# Patient Record
Sex: MALE | Race: WHITE | NOT HISPANIC OR LATINO | Employment: UNEMPLOYED | URBAN - METROPOLITAN AREA
[De-identification: names, ages, dates, MRNs, and addresses within clinical notes are randomized per-mention and may not be internally consistent; named-entity substitution may affect disease eponyms.]

---

## 2022-07-11 ENCOUNTER — APPOINTMENT (OUTPATIENT)
Dept: CT IMAGING | Facility: CLINIC | Age: 47
End: 2022-07-11
Attending: EMERGENCY MEDICINE
Payer: MEDICAID

## 2022-07-11 ENCOUNTER — HOSPITAL ENCOUNTER (INPATIENT)
Facility: CLINIC | Age: 47
LOS: 4 days | Discharge: HOME | End: 2022-07-15
Attending: EMERGENCY MEDICINE | Admitting: INTERNAL MEDICINE
Payer: MEDICAID

## 2022-07-11 ENCOUNTER — APPOINTMENT (OUTPATIENT)
Dept: GENERAL RADIOLOGY | Facility: CLINIC | Age: 47
End: 2022-07-11
Attending: EMERGENCY MEDICINE
Payer: MEDICAID

## 2022-07-11 DIAGNOSIS — F22 PARANOIA (H): ICD-10-CM

## 2022-07-11 DIAGNOSIS — N17.9 ACUTE RENAL FAILURE, UNSPECIFIED ACUTE RENAL FAILURE TYPE (H): ICD-10-CM

## 2022-07-11 LAB
ALBUMIN SERPL-MCNC: 4.5 G/DL (ref 3.4–5)
ALBUMIN UR-MCNC: 30 MG/DL
ALP SERPL-CCNC: 71 U/L (ref 40–150)
ALT SERPL W P-5'-P-CCNC: 49 U/L (ref 0–70)
AMPHETAMINES UR QL SCN: ABNORMAL
ANION GAP SERPL CALCULATED.3IONS-SCNC: 10 MMOL/L (ref 3–14)
APPEARANCE UR: ABNORMAL
AST SERPL W P-5'-P-CCNC: 54 U/L (ref 0–45)
BARBITURATES UR QL: ABNORMAL
BASOPHILS # BLD AUTO: 0.1 10E3/UL (ref 0–0.2)
BASOPHILS NFR BLD AUTO: 0 %
BENZODIAZ UR QL: ABNORMAL
BILIRUB SERPL-MCNC: 0.8 MG/DL (ref 0.2–1.3)
BILIRUB UR QL STRIP: ABNORMAL
BUN SERPL-MCNC: 66 MG/DL (ref 7–30)
CALCIUM SERPL-MCNC: 8.5 MG/DL (ref 8.5–10.1)
CANNABINOIDS UR QL SCN: ABNORMAL
CHLORIDE BLD-SCNC: 97 MMOL/L (ref 94–109)
CK SERPL-CCNC: 1691 U/L (ref 30–300)
CO2 SERPL-SCNC: 22 MMOL/L (ref 20–32)
COCAINE UR QL: ABNORMAL
COLOR UR AUTO: YELLOW
CREAT SERPL-MCNC: 4.34 MG/DL (ref 0.66–1.25)
EOSINOPHIL # BLD AUTO: 0.1 10E3/UL (ref 0–0.7)
EOSINOPHIL NFR BLD AUTO: 1 %
ERYTHROCYTE [DISTWIDTH] IN BLOOD BY AUTOMATED COUNT: 12.7 % (ref 10–15)
ETHANOL SERPL-MCNC: <0.01 G/DL
GFR SERPL CREATININE-BSD FRML MDRD: 16 ML/MIN/1.73M2
GLUCOSE BLD-MCNC: 114 MG/DL (ref 70–99)
GLUCOSE UR STRIP-MCNC: NEGATIVE MG/DL
HCT VFR BLD AUTO: 50.1 % (ref 40–53)
HGB BLD-MCNC: 17 G/DL (ref 13.3–17.7)
HGB UR QL STRIP: NEGATIVE
HOLD SPECIMEN: NORMAL
HOLD SPECIMEN: NORMAL
HYALINE CASTS: 97 /LPF
IMM GRANULOCYTES # BLD: 0.1 10E3/UL
IMM GRANULOCYTES NFR BLD: 1 %
KETONES UR STRIP-MCNC: ABNORMAL MG/DL
LEUKOCYTE ESTERASE UR QL STRIP: ABNORMAL
LYMPHOCYTES # BLD AUTO: 2 10E3/UL (ref 0.8–5.3)
LYMPHOCYTES NFR BLD AUTO: 12 %
MCH RBC QN AUTO: 31.2 PG (ref 26.5–33)
MCHC RBC AUTO-ENTMCNC: 33.9 G/DL (ref 31.5–36.5)
MCV RBC AUTO: 92 FL (ref 78–100)
MONOCYTES # BLD AUTO: 1.2 10E3/UL (ref 0–1.3)
MONOCYTES NFR BLD AUTO: 7 %
MUCOUS THREADS #/AREA URNS LPF: PRESENT /LPF
NEUTROPHILS # BLD AUTO: 13.2 10E3/UL (ref 1.6–8.3)
NEUTROPHILS NFR BLD AUTO: 79 %
NITRATE UR QL: NEGATIVE
NRBC # BLD AUTO: 0 10E3/UL
NRBC BLD AUTO-RTO: 0 /100
OPIATES UR QL SCN: ABNORMAL
PH UR STRIP: 5 [PH] (ref 5–7)
PLATELET # BLD AUTO: 195 10E3/UL (ref 150–450)
POTASSIUM BLD-SCNC: 3.2 MMOL/L (ref 3.4–5.3)
PROT SERPL-MCNC: 8.3 G/DL (ref 6.8–8.8)
RBC # BLD AUTO: 5.45 10E6/UL (ref 4.4–5.9)
RBC URINE: 2 /HPF
SARS-COV-2 RNA RESP QL NAA+PROBE: NEGATIVE
SODIUM SERPL-SCNC: 129 MMOL/L (ref 133–144)
SP GR UR STRIP: 1.02 (ref 1–1.03)
SQUAMOUS EPITHELIAL: <1 /HPF
TSH SERPL DL<=0.005 MIU/L-ACNC: 1.34 MU/L (ref 0.4–4)
UROBILINOGEN UR STRIP-MCNC: 3 MG/DL
WBC # BLD AUTO: 16.7 10E3/UL (ref 4–11)
WBC URINE: 3 /HPF

## 2022-07-11 PROCEDURE — 82077 ASSAY SPEC XCP UR&BREATH IA: CPT | Performed by: EMERGENCY MEDICINE

## 2022-07-11 PROCEDURE — 82550 ASSAY OF CK (CPK): CPT | Performed by: EMERGENCY MEDICINE

## 2022-07-11 PROCEDURE — 36415 COLL VENOUS BLD VENIPUNCTURE: CPT | Performed by: EMERGENCY MEDICINE

## 2022-07-11 PROCEDURE — 80053 COMPREHEN METABOLIC PANEL: CPT | Performed by: EMERGENCY MEDICINE

## 2022-07-11 PROCEDURE — 258N000003 HC RX IP 258 OP 636: Performed by: EMERGENCY MEDICINE

## 2022-07-11 PROCEDURE — 84100 ASSAY OF PHOSPHORUS: CPT | Performed by: INTERNAL MEDICINE

## 2022-07-11 PROCEDURE — 99285 EMERGENCY DEPT VISIT HI MDM: CPT | Mod: 25

## 2022-07-11 PROCEDURE — 93005 ELECTROCARDIOGRAM TRACING: CPT

## 2022-07-11 PROCEDURE — C9803 HOPD COVID-19 SPEC COLLECT: HCPCS

## 2022-07-11 PROCEDURE — 81001 URINALYSIS AUTO W/SCOPE: CPT | Performed by: EMERGENCY MEDICINE

## 2022-07-11 PROCEDURE — 84300 ASSAY OF URINE SODIUM: CPT | Performed by: INTERNAL MEDICINE

## 2022-07-11 PROCEDURE — 74176 CT ABD & PELVIS W/O CONTRAST: CPT

## 2022-07-11 PROCEDURE — U0005 INFEC AGEN DETEC AMPLI PROBE: HCPCS | Performed by: EMERGENCY MEDICINE

## 2022-07-11 PROCEDURE — 80307 DRUG TEST PRSMV CHEM ANLYZR: CPT | Performed by: EMERGENCY MEDICINE

## 2022-07-11 PROCEDURE — 120N000001 HC R&B MED SURG/OB

## 2022-07-11 PROCEDURE — 84443 ASSAY THYROID STIM HORMONE: CPT | Performed by: EMERGENCY MEDICINE

## 2022-07-11 PROCEDURE — 73130 X-RAY EXAM OF HAND: CPT | Mod: RT

## 2022-07-11 PROCEDURE — 85025 COMPLETE CBC W/AUTO DIFF WBC: CPT | Performed by: EMERGENCY MEDICINE

## 2022-07-11 PROCEDURE — 96360 HYDRATION IV INFUSION INIT: CPT

## 2022-07-11 RX ADMIN — SODIUM CHLORIDE 1000 ML: 9 INJECTION, SOLUTION INTRAVENOUS at 22:49

## 2022-07-11 ASSESSMENT — ENCOUNTER SYMPTOMS
DIARRHEA: 1
FREQUENCY: 1

## 2022-07-11 ASSESSMENT — ACTIVITIES OF DAILY LIVING (ADL): ADLS_ACUITY_SCORE: 35
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
## 2022-07-12 ENCOUNTER — TELEPHONE (OUTPATIENT)
Dept: BEHAVIORAL HEALTH | Facility: CLINIC | Age: 47
End: 2022-07-12

## 2022-07-12 LAB
ANION GAP SERPL CALCULATED.3IONS-SCNC: 7 MMOL/L (ref 3–14)
ATRIAL RATE - MUSE: 110 BPM
BUN SERPL-MCNC: 63 MG/DL (ref 7–30)
CALCIUM SERPL-MCNC: 7.8 MG/DL (ref 8.5–10.1)
CHLORIDE BLD-SCNC: 104 MMOL/L (ref 94–109)
CK SERPL-CCNC: 1064 U/L (ref 30–300)
CO2 SERPL-SCNC: 23 MMOL/L (ref 20–32)
CREAT SERPL-MCNC: 2.86 MG/DL (ref 0.66–1.25)
CREAT UR-MCNC: 653 MG/DL
DIASTOLIC BLOOD PRESSURE - MUSE: NORMAL MMHG
ERYTHROCYTE [DISTWIDTH] IN BLOOD BY AUTOMATED COUNT: 12.8 % (ref 10–15)
FRACT EXCRET NA UR+SERPL-RTO: 0.1 %
GFR SERPL CREATININE-BSD FRML MDRD: 26 ML/MIN/1.73M2
GLUCOSE BLD-MCNC: 86 MG/DL (ref 70–99)
HCT VFR BLD AUTO: 45.8 % (ref 40–53)
HGB BLD-MCNC: 15.4 G/DL (ref 13.3–17.7)
INTERPRETATION ECG - MUSE: NORMAL
MCH RBC QN AUTO: 31.4 PG (ref 26.5–33)
MCHC RBC AUTO-ENTMCNC: 33.6 G/DL (ref 31.5–36.5)
MCV RBC AUTO: 94 FL (ref 78–100)
P AXIS - MUSE: 64 DEGREES
PHOSPHATE SERPL-MCNC: 4.8 MG/DL (ref 2.5–4.5)
PLATELET # BLD AUTO: 142 10E3/UL (ref 150–450)
POTASSIUM BLD-SCNC: 3.4 MMOL/L (ref 3.4–5.3)
PR INTERVAL - MUSE: 154 MS
QRS DURATION - MUSE: 98 MS
QT - MUSE: 358 MS
QTC - MUSE: 484 MS
R AXIS - MUSE: 46 DEGREES
RBC # BLD AUTO: 4.9 10E6/UL (ref 4.4–5.9)
SODIUM SERPL-SCNC: 134 MMOL/L (ref 133–144)
SODIUM UR-SCNC: 17 MMOL/L
SYSTOLIC BLOOD PRESSURE - MUSE: NORMAL MMHG
T AXIS - MUSE: 74 DEGREES
VENTRICULAR RATE- MUSE: 110 BPM
WBC # BLD AUTO: 9.5 10E3/UL (ref 4–11)

## 2022-07-12 PROCEDURE — 99222 1ST HOSP IP/OBS MODERATE 55: CPT | Mod: AI | Performed by: INTERNAL MEDICINE

## 2022-07-12 PROCEDURE — 82550 ASSAY OF CK (CPK): CPT | Performed by: INTERNAL MEDICINE

## 2022-07-12 PROCEDURE — 82310 ASSAY OF CALCIUM: CPT | Performed by: INTERNAL MEDICINE

## 2022-07-12 PROCEDURE — 258N000003 HC RX IP 258 OP 636: Performed by: INTERNAL MEDICINE

## 2022-07-12 PROCEDURE — 85027 COMPLETE CBC AUTOMATED: CPT | Performed by: INTERNAL MEDICINE

## 2022-07-12 PROCEDURE — 120N000001 HC R&B MED SURG/OB

## 2022-07-12 PROCEDURE — 36415 COLL VENOUS BLD VENIPUNCTURE: CPT | Performed by: INTERNAL MEDICINE

## 2022-07-12 RX ORDER — LIDOCAINE 40 MG/G
CREAM TOPICAL
Status: DISCONTINUED | OUTPATIENT
Start: 2022-07-12 | End: 2022-07-15 | Stop reason: HOSPADM

## 2022-07-12 RX ORDER — LISINOPRIL 20 MG/1
20 TABLET ORAL DAILY
COMMUNITY

## 2022-07-12 RX ORDER — SODIUM CHLORIDE 9 MG/ML
INJECTION, SOLUTION INTRAVENOUS CONTINUOUS
Status: DISCONTINUED | OUTPATIENT
Start: 2022-07-12 | End: 2022-07-13

## 2022-07-12 RX ORDER — ACETAMINOPHEN 650 MG/1
650 SUPPOSITORY RECTAL EVERY 6 HOURS PRN
Status: DISCONTINUED | OUTPATIENT
Start: 2022-07-12 | End: 2022-07-15 | Stop reason: HOSPADM

## 2022-07-12 RX ORDER — RISANKIZUMAB-RZAA 150 MG/ML
150 INJECTION SUBCUTANEOUS
COMMUNITY

## 2022-07-12 RX ORDER — HALOPERIDOL 5 MG/ML
2 INJECTION INTRAMUSCULAR EVERY 6 HOURS PRN
Status: DISCONTINUED | OUTPATIENT
Start: 2022-07-12 | End: 2022-07-15 | Stop reason: HOSPADM

## 2022-07-12 RX ORDER — ONDANSETRON 4 MG/1
4 TABLET, ORALLY DISINTEGRATING ORAL EVERY 6 HOURS PRN
Status: DISCONTINUED | OUTPATIENT
Start: 2022-07-12 | End: 2022-07-15 | Stop reason: HOSPADM

## 2022-07-12 RX ORDER — ACETAMINOPHEN 325 MG/1
650 TABLET ORAL EVERY 6 HOURS PRN
Status: DISCONTINUED | OUTPATIENT
Start: 2022-07-12 | End: 2022-07-15 | Stop reason: HOSPADM

## 2022-07-12 RX ORDER — QUETIAPINE FUMARATE 25 MG/1
25 TABLET, FILM COATED ORAL 2 TIMES DAILY PRN
Status: DISCONTINUED | OUTPATIENT
Start: 2022-07-12 | End: 2022-07-15 | Stop reason: HOSPADM

## 2022-07-12 RX ORDER — ONDANSETRON 2 MG/ML
4 INJECTION INTRAMUSCULAR; INTRAVENOUS EVERY 6 HOURS PRN
Status: DISCONTINUED | OUTPATIENT
Start: 2022-07-12 | End: 2022-07-15 | Stop reason: HOSPADM

## 2022-07-12 RX ADMIN — SODIUM CHLORIDE: 9 INJECTION, SOLUTION INTRAVENOUS at 02:23

## 2022-07-12 RX ADMIN — SODIUM CHLORIDE 1000 ML: 9 INJECTION, SOLUTION INTRAVENOUS at 08:25

## 2022-07-12 RX ADMIN — SODIUM CHLORIDE: 9 INJECTION, SOLUTION INTRAVENOUS at 12:25

## 2022-07-12 ASSESSMENT — ACTIVITIES OF DAILY LIVING (ADL)
ADLS_ACUITY_SCORE: 35

## 2022-07-12 NOTE — ED NOTES
Patient said he wanted to leave bacause he cannot afford the hospital bill. He was agreeable to wait in room until MD could be paged. MD paged. MD placed order for 72 hr hold. MD now talking to patient.
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
## 2022-07-12 NOTE — CONSULTS
Received request for consult.  Reviewed MR and noted Sticky Note that stated no consult until pt has moved to inpt bed (note @ 8:45a).  As pt not yet placed, will delay consult per provider's request.
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
## 2022-07-12 NOTE — PHARMACY-ADMISSION MEDICATION HISTORY
Admission medication history interview status for this patient is complete. See [facility] admission navigator for allergy information, prior to admission medications and immunization status.     Medication history interview done, indicate source(s): Patient  Medication history resources (including written lists, pill bottles, clinic record):None  Pharmacy: [facility] [place]    Changes made to PTA medication list:  Added: all meds  Changed:-  Reported as Not Taking:-  Removed:-    Actions taken by pharmacist (provider contacted, etc):None   Additional medication history information:None  Medication reconciliation/reorder completed by provider prior to medication history?  no    Prior to Admission medications    Medication Sig Last Dose Taking? Auth Provider Long Term End Date   lisinopril (ZESTRIL) 20 MG tablet Take 20 mg by mouth daily 7/10/2022 at am Yes Unknown, Entered By History Yes    Risankizumab-rzaa (SKYRIZI) 150 MG/ML subcutaneous Inject 150 mg Subcutaneous every 3 months 5/11/2022 Yes Unknown, Entered By History

## 2022-07-12 NOTE — PROGRESS NOTES
Pt attempted to leave hospital AMA. Code 21 was called; Pt made it outside of hospital, Security intervened and safely brought pt inside hospital to his room 331. Pt was provided information on why he was on a 72 hr hold; pt verbalized understanding

## 2022-07-12 NOTE — PROGRESS NOTES
I spoke with behavioral health Eva Hopper who placed on intake list.  Day provider should reach out when medically ready for discharge.     Obed Odonnell MD

## 2022-07-12 NOTE — TELEPHONE ENCOUNTER
S DEC called to give clinical on 47/M on [facility] med surge 3 [phone]    B Admitted with acute renal failure, psychosis, delusional. Schizoaffective bipolar type. Worsening delusions and paranoia. No SI or HI. No MH meds. Tried to elope from hospital this afternoon, but came back and calm down. Pt reports he is also willing to come in voluntary. No prev IPMH stays. On IV drip, medically cleared tomorrow. Ambulatory, eating drinking.     A 72HH, [facility] area for placement.     R [facility] 3 awaiting placement

## 2022-07-13 ENCOUNTER — TELEPHONE (OUTPATIENT)
Dept: BEHAVIORAL HEALTH | Facility: CLINIC | Age: 47
End: 2022-07-13

## 2022-07-13 LAB
ANION GAP SERPL CALCULATED.3IONS-SCNC: 4 MMOL/L (ref 3–14)
BUN SERPL-MCNC: 31 MG/DL (ref 7–30)
CALCIUM SERPL-MCNC: 7.6 MG/DL (ref 8.5–10.1)
CHLORIDE BLD-SCNC: 111 MMOL/L (ref 94–109)
CK SERPL-CCNC: 579 U/L (ref 30–300)
CO2 SERPL-SCNC: 24 MMOL/L (ref 20–32)
CREAT SERPL-MCNC: 0.96 MG/DL (ref 0.66–1.25)
GFR SERPL CREATININE-BSD FRML MDRD: >90 ML/MIN/1.73M2
GLUCOSE BLD-MCNC: 91 MG/DL (ref 70–99)
POTASSIUM BLD-SCNC: 4 MMOL/L (ref 3.4–5.3)
SODIUM SERPL-SCNC: 139 MMOL/L (ref 133–144)

## 2022-07-13 PROCEDURE — 258N000003 HC RX IP 258 OP 636: Performed by: INTERNAL MEDICINE

## 2022-07-13 PROCEDURE — 99232 SBSQ HOSP IP/OBS MODERATE 35: CPT | Performed by: INTERNAL MEDICINE

## 2022-07-13 PROCEDURE — 82550 ASSAY OF CK (CPK): CPT | Performed by: INTERNAL MEDICINE

## 2022-07-13 PROCEDURE — 250N000013 HC RX MED GY IP 250 OP 250 PS 637: Performed by: INTERNAL MEDICINE

## 2022-07-13 PROCEDURE — 82310 ASSAY OF CALCIUM: CPT | Performed by: INTERNAL MEDICINE

## 2022-07-13 PROCEDURE — 99252 IP/OBS CONSLTJ NEW/EST SF 35: CPT | Mod: 93 | Performed by: NURSE PRACTITIONER

## 2022-07-13 PROCEDURE — 120N000001 HC R&B MED SURG/OB

## 2022-07-13 PROCEDURE — 36415 COLL VENOUS BLD VENIPUNCTURE: CPT | Performed by: INTERNAL MEDICINE

## 2022-07-13 RX ORDER — HYDROXYZINE HYDROCHLORIDE 50 MG/1
50 TABLET, FILM COATED ORAL 3 TIMES DAILY
Status: DISCONTINUED | OUTPATIENT
Start: 2022-07-13 | End: 2022-07-14

## 2022-07-13 RX ORDER — DIAPER,BRIEF,INFANT-TODD,DISP
EACH MISCELLANEOUS 2 TIMES DAILY
Status: DISCONTINUED | OUTPATIENT
Start: 2022-07-13 | End: 2022-07-15 | Stop reason: HOSPADM

## 2022-07-13 RX ORDER — OLANZAPINE 5 MG/1
5 TABLET, ORALLY DISINTEGRATING ORAL AT BEDTIME
Status: DISCONTINUED | OUTPATIENT
Start: 2022-07-13 | End: 2022-07-14

## 2022-07-13 RX ADMIN — HYDROXYZINE HYDROCHLORIDE 50 MG: 50 TABLET, FILM COATED ORAL at 22:33

## 2022-07-13 RX ADMIN — SODIUM CHLORIDE: 9 INJECTION, SOLUTION INTRAVENOUS at 00:24

## 2022-07-13 RX ADMIN — OLANZAPINE 5 MG: 5 TABLET, ORALLY DISINTEGRATING ORAL at 22:33

## 2022-07-13 RX ADMIN — HYDROCORTISONE: 0.5 CREAM TOPICAL at 23:08

## 2022-07-13 RX ADMIN — HYDROXYZINE HYDROCHLORIDE 50 MG: 50 TABLET, FILM COATED ORAL at 16:19

## 2022-07-13 RX ADMIN — SODIUM CHLORIDE: 9 INJECTION, SOLUTION INTRAVENOUS at 08:03

## 2022-07-13 ASSESSMENT — ACTIVITIES OF DAILY LIVING (ADL)
ADLS_ACUITY_SCORE: 35

## 2022-07-13 NOTE — TELEPHONE ENCOUNTER
S: Vivienne,  [phone], [facility] 3rd Floor, [phone],     B: Hx of schizophrenia, depression   Per chart review pt is experiencing paranoia and delusional thoughts that are affecting the pts ability to reside indep and safely in the community, and would benefit from IP MH and med stabilization   Pt was admitted to medical after acute kidney injury due to dehydration and poor PO intake on 7/11/22  Pt denies SI, HI, aggression, SIB  Pt attempted to leave AMA yesterday (7/12), was placed on a hold   Pt endorses THC use     covid neg   Medically cleared, eating, drinking, ambulating indep    A: 72  7/12@300pm     R: Pt placed on work list until appropriate placement is available

## 2022-07-13 NOTE — PLAN OF CARE
Goal Outcome Evaluation:    A/O x 4, VSS on RA. Pt was cooperative with pm cares. Denied pain. Pt is on 72 HR hold. Has  ml/hr running; new PIV
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
## 2022-07-13 NOTE — CONSULTS
Care Management Follow Up    Length of Stay (days): 2    Expected Discharge Date: 07/14/2022     Concerns to be Addressed:  72 hour hold     Patient plan of care discussed at interdisciplinary rounds: Yes     Agreement with the Plan:      Referrals Placed by CM/PANFILO:    Private pay costs discussed: Not applicable    Additional Information:  SW was consulted for a 72 hour hold. Care Management team does not do 72-hour holds. This should be completed by RN or MD.  Consult cleared. Please re consult SW when patient is medically ready to be placed on the IP Psych list.     Of note, patient's hold expires 7/15/22 at 1500. There is a possibility that there would not be an inpatient psych bed prior to the hold expiring. Psych has been consulted.     GELY Mccurdy, SW  Emergency Room

## 2022-07-13 NOTE — CONSULTS
Care Management Follow Up    Length of Stay (days): 2    Expected Discharge Date: 07/14/2022     Concerns to be Addressed:       Patient plan of care discussed at interdisciplinary rounds: Yes    Anticipated Discharge Disposition:       Anticipated Discharge Services:    Anticipated Discharge DME:      Patient/family educated on Medicare website which has current facility and service quality ratings:    Education Provided on the Discharge Plan:    Patient/Family in Agreement with the Plan:      Referrals Placed by CM/SW:    Private pay costs discussed: Not applicable    Additional Information:  Patient referred to financial counselor about concerns over his medical bill. PANFILO tried to place patient on the inpatient psych wait list. Central intake reports that he has to be completely off IV fluids before the patient can be placed on wait list despite the notes stating that the patient is medically stable. There are 30 patients on the wait list. PANFILO will update MD.     Per MD, patient has stopped IV fluids and is eating.     Financial Counselor have met with patient and he has active insurance.     Addendum 1550:  Psych is requesting a commitment. PANFILO paged MD. Will need paperwork completed.         Vivienne Burns, GELY, SW  Emergency Room       Vivienne Burns

## 2022-07-13 NOTE — CONSULTS
Triage and Transition - Consult and Liaison     Chevy Morris  1975 July 13, 2022    Psychiatry consult acknowledged. Please see note completed by Nathalie Linares  on 7/13/22.  [facility] will see patient tomorrow for follow up as well.     BEATA NICHOLS MSW, [facility]  Triage and Transition - Consult and Liaison   [phone]

## 2022-07-13 NOTE — TELEPHONE ENCOUNTER
R: Pt currently on [facility] 3 Med/surg awaiting medical to behavioral transfer.    1108 Per chart review patient is not medically cleared still requiring IVF bolus and continue IVFs. Intake escalated to intake management. Intake awaiting response.     1406 Per chart review patient is no longer requiring IVF. Pt still has IV in place which is exclusionary criteria for IP MH. Intake awaiting medical clearance prior to bed search. Provider states that recommends psych reassessment tomorrow. Intake awaiting assessment and medical clearance.

## 2022-07-13 NOTE — PLAN OF CARE
Goal Outcome Evaluation:    Please see flowsheets for detailed vital signs and assessments.   Neuro: alert, oriented  Vital Signs: stable  Pain: denies  O2: mid 90s RA  Tele: N/A  Respiratory: LS WDL  GI: WDL  : voiding w/o difficulty  Skin: Redness to feet  Activity: independent in room  IVF:  ml/hour  Notable labs: K 3.4, Cr 2.86 (trending down) CK 1064 (trending down)  Protocols: N/A  Plan: IV hydration, 1:1 monitoring (high flight risk), 72-hour hold and probable inpatient psychiatric hospitalization. Continue with plan of care.   Discharge: 2-3 days

## 2022-07-13 NOTE — PLAN OF CARE
VSS except slight HTN of 150/93, on RA, A/OX4, LS clear, IVF discontinued, pt eating well, door alarm active, pt on 72 hour hold, discharge timeline TBD.

## 2022-07-14 ENCOUNTER — TELEPHONE (OUTPATIENT)
Dept: BEHAVIORAL HEALTH | Facility: CLINIC | Age: 47
End: 2022-07-14

## 2022-07-14 LAB
ANION GAP SERPL CALCULATED.3IONS-SCNC: 3 MMOL/L (ref 3–14)
BUN SERPL-MCNC: 20 MG/DL (ref 7–30)
CALCIUM SERPL-MCNC: 8.6 MG/DL (ref 8.5–10.1)
CHLORIDE BLD-SCNC: 113 MMOL/L (ref 94–109)
CK SERPL-CCNC: 282 U/L (ref 30–300)
CO2 SERPL-SCNC: 25 MMOL/L (ref 20–32)
CREAT SERPL-MCNC: 0.89 MG/DL (ref 0.66–1.25)
GFR SERPL CREATININE-BSD FRML MDRD: >90 ML/MIN/1.73M2
GLUCOSE BLD-MCNC: 82 MG/DL (ref 70–99)
POTASSIUM BLD-SCNC: 4.2 MMOL/L (ref 3.4–5.3)
SODIUM SERPL-SCNC: 141 MMOL/L (ref 133–144)

## 2022-07-14 PROCEDURE — 99232 SBSQ HOSP IP/OBS MODERATE 35: CPT | Performed by: INTERNAL MEDICINE

## 2022-07-14 PROCEDURE — 82550 ASSAY OF CK (CPK): CPT | Performed by: INTERNAL MEDICINE

## 2022-07-14 PROCEDURE — 36415 COLL VENOUS BLD VENIPUNCTURE: CPT | Performed by: INTERNAL MEDICINE

## 2022-07-14 PROCEDURE — 120N000001 HC R&B MED SURG/OB

## 2022-07-14 PROCEDURE — 80048 BASIC METABOLIC PNL TOTAL CA: CPT | Performed by: INTERNAL MEDICINE

## 2022-07-14 PROCEDURE — 250N000013 HC RX MED GY IP 250 OP 250 PS 637: Performed by: INTERNAL MEDICINE

## 2022-07-14 PROCEDURE — 99232 SBSQ HOSP IP/OBS MODERATE 35: CPT | Performed by: NURSE PRACTITIONER

## 2022-07-14 RX ORDER — LISINOPRIL 20 MG/1
20 TABLET ORAL DAILY
Status: DISCONTINUED | OUTPATIENT
Start: 2022-07-14 | End: 2022-07-15 | Stop reason: HOSPADM

## 2022-07-14 RX ORDER — HYDROXYZINE HYDROCHLORIDE 25 MG/1
25 TABLET, FILM COATED ORAL 3 TIMES DAILY
Status: DISCONTINUED | OUTPATIENT
Start: 2022-07-14 | End: 2022-07-15

## 2022-07-14 RX ORDER — OLANZAPINE 5 MG/1
10 TABLET, ORALLY DISINTEGRATING ORAL AT BEDTIME
Status: DISCONTINUED | OUTPATIENT
Start: 2022-07-14 | End: 2022-07-15 | Stop reason: HOSPADM

## 2022-07-14 RX ADMIN — HYDROXYZINE HYDROCHLORIDE 50 MG: 50 TABLET, FILM COATED ORAL at 10:08

## 2022-07-14 RX ADMIN — LISINOPRIL 20 MG: 20 TABLET ORAL at 13:43

## 2022-07-14 RX ADMIN — HYDROCORTISONE: 0.5 CREAM TOPICAL at 10:08

## 2022-07-14 RX ADMIN — OLANZAPINE 10 MG: 5 TABLET, ORALLY DISINTEGRATING ORAL at 21:38

## 2022-07-14 RX ADMIN — HYDROXYZINE HYDROCHLORIDE 25 MG: 25 TABLET, FILM COATED ORAL at 21:38

## 2022-07-14 RX ADMIN — HYDROXYZINE HYDROCHLORIDE 25 MG: 25 TABLET, FILM COATED ORAL at 16:13

## 2022-07-14 RX ADMIN — HYDROCORTISONE: 0.5 CREAM TOPICAL at 21:38

## 2022-07-14 ASSESSMENT — ACTIVITIES OF DAILY LIVING (ADL)
ADLS_ACUITY_SCORE: 35
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
## 2022-07-14 NOTE — DISCHARGE INSTRUCTIONS
[facility]  Referral made.  They will contact you directly for appointments to support while waiting for your initial psychiatry appointment. If they do no reachout or if you need to change appointment or have further questions please contact them directly at [phone].     Scheduled Appointment  Date: Monday, 8/15/2022  Time: 2:00 pm - 3:00 pm  Provider: Bismark Solitario MA, CNPRN  Location: [facility], [address] [place] [address], [address], [place] [address]  Phone: [phone]  Type: Telepsychiatry    Patient Instructions  Please fill New Patient Form by using following link. All forms need to be completed 72 hours prior to the appointment date/time by going to [email or web address] Please call us on [phone] 96 hours prior to your scheduled appointment to confirm that you are able to attend. We will provide you information about how to log into video call software when you call.    Scheduled Appointment  Date: Monday, 7/18/2022  Time: 1:00 pm - 2:00 pm  Provider: Jossy Dixon  Location: [place] [id number]  Phone: [phone]  Type: Teletherapy    Patient Instructions  For all appointments: you will be sent information to fill out the intake paperwork online. Please fill the paperwork prior to your appointment. For telehealth appointments: you will also be sent a zoom link to attend the appointment remotely.

## 2022-07-14 NOTE — TELEPHONE ENCOUNTER
Writer has fwd medication management referral only to TC RN pool as next level of care set and replied to referral source. Will wait to hear if referral is appropriate or inappropriate.    Yelena Batistarera  [id number] [phone]    ----- Message from Beata Rod sent at 7/14/2022  3:08 PM CDT -----  Regarding: Referral  Transition Clinic Referral   [place] Only   Limited [place] Availability    Type of Referral:    ____Therapy Only   _x___Medication Only: Referral will automatically be declined if no next level of care scheduled. Suboxone and Opioid management referrals are automatically denied.  ____Therapy & Medication:  Referral will automatically be declined if no next level of care scheduled. Suboxone and Opioid management referrals are automatically denied.  ____Diagnostic Assessment     Suboxone and Opioid Management Referrals are Automatically Denied    TC Psychiatry cannot see patients who do not have active medical insurance: obtained PMI will in hospital for [employer]. He has this.     Referring Provider Name: BEATA ROD    Clinician completing the assessment.BEATA ROD      Referring Provider: [facility]AGE [facility] TRANSITION ([facility]) CLINICIAN     If known, referring provider contact name: BEATA ROD  ; Phone Number: [phone]  Service Line/Location: C&L     Reason for Transition Clinic Referral: depression, anxiety and paranoid delusions    Scheduled Appointment  Date: Monday, 8/15/2022  Time: 2:00 pm - 3:00 pm  Provider: Bismark Solitario MA, CNP,RN  Location: [facility], [address], [address], [place] [address]  Phone: [phone]  Type: Telepsychiatry    Patient Instructions  Please fill New Patient Form by using following link. All forms need to be completed 72 hours prior to the appointment date/time by going to [email or web address] Please call us on [phone] 96 hours prior to your scheduled appointment to confirm that you are able to  attend. We will provide you information about how to log into video call software when you call.    Scheduled Appointment  Date: Monday, 7/18/2022  Time: 1:00 pm - 2:00 pm  Provider: Jossy Dixon  Location: [facility], [address] [place] [address]  Phone: [phone]  Type: Teletherapy    Patient Instructions  For all appointments: you will be sent information to fill out the intake paperwork online. Please fill the paperwork prior to your appointment. For telehealth appointments: you will also be sent a zoom link to attend the appointment remotely.    What Would Be Helpful from the Transition Clinic: Please contact after discharge not before to avoid confusion.      Needs: NO    Does Patient Have Access to Technology: yes    Patient E-mail Address: No e-mail address on record    Current Patient Phone Number: [phone]    Clinician Gender Preference (if applicable): DUNIA NICHOLS
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
## 2022-07-14 NOTE — CONSULTS
Triage and Transition - Consult and Liaison     Chevy Morris  July 14, 2022      This note is entered in addition to note by this Writer and Yinka Che on 7/14/22. Triage and Transition Consult and Liaison has facilitated setting up appointments for Chevy Morris providing outpatient psychiatry and psychotherapy The appointments are as follows:     [facility]  Referral made.  They will contact you directly for appointments to support while waiting for your initial psychiatry appointment. If they do no reachout or if you need to change appointment or have further questions please contact them directly at [phone].     Scheduled Appointment  Date: Monday, 8/15/2022  Time: 2:00 pm - 3:00 pm  Provider: Bismark Solitario MA, CNP,RN  Location: [facility], [address] [place] [address] [address], [place] [address]  Phone: [phone]  Type: Telepsychiatry    Patient Instructions  Please fill New Patient Form by using following link. All forms need to be completed 72 hours prior to the appointment date/time by going to [email or web address] Please call us on [phone] 96 hours prior to your scheduled appointment to confirm that you are able to attend. We will provide you information about how to log into video call software when you call.    Scheduled Appointment  Date: Monday, 7/18/2022  Time: 1:00 pm - 2:00 pm  Provider: Jossy Dixon  Location: [place] [id number]  Phone: [phone]  Type: Teletherapy    Patient Instructions  For all appointments: you will be sent information to fill out the intake paperwork online. Please fill the paperwork prior to your appointment. For telehealth appointments: you will also be sent a zoom link to attend the appointment remotely.    Information has been added to AVS to be provided to patient at discharge.     BEATA NICHOLS MSW, LICSW  Triage and Transition - Consult and  Liaison   [phone]      [facility] maintains an extensive network of licensed behavioral health providers to connect patients with the services they need.  We do not charge providers a fee to participate in our referral network.  We match patients with providers based on a patient s specific treatment needs, insurance coverage, and location. Our first effort will be to refer you to a provider within your care system and will utilize providers outside your care system as needed.

## 2022-07-14 NOTE — PLAN OF CARE
[id number]    Pt alert and oriented x4. Up Independent in the room. Denies pain. VSS. Calm and cooperative with staff. Will continue to monitor.

## 2022-07-14 NOTE — PLAN OF CARE
9:45 Psych spoke with pt via I-pad.    14:35  No c/o pain this shift.  Alert and oriented.  Cooperative with cares. No evidence of delusions today. Mobility, independent in room. Door alarm in place. Lungs clear.  98% RA.  No tele. Regular diet.  Voiding without difficulty.  PIV saline locked.  Hydrocortisone applied to rash on feet per orders. Psych following.  72 hour hold.  Likely to discharge after hold expires per MD note.

## 2022-07-14 NOTE — PLAN OF CARE
Goal Outcome Evaluation:    Plan of Care Reviewed With: patient     Overall Patient Progress: improving       Blood pressure (!) 150/93, pulse 72, temperature 98.2  F (36.8  C), temperature source Oral, resp. rate 20, SpO2 97 %.    VSS other than elevated BP.  Pt alert and oriented x 4 on RA.  States that hospital is a safe environment.  PIV on left SL.  LS Clear.  Door alarm on and active. PT on 72 hour hold.  Discharge timeline TBD. Hydrocortisone cream started for rash on feet.  Continue with POC.

## 2022-07-15 ENCOUNTER — TELEPHONE (OUTPATIENT)
Dept: BEHAVIORAL HEALTH | Facility: CLINIC | Age: 47
End: 2022-07-15

## 2022-07-15 VITALS
DIASTOLIC BLOOD PRESSURE: 92 MMHG | TEMPERATURE: 98.1 F | RESPIRATION RATE: 18 BRPM | OXYGEN SATURATION: 99 % | HEART RATE: 74 BPM | SYSTOLIC BLOOD PRESSURE: 155 MMHG

## 2022-07-15 PROCEDURE — 250N000013 HC RX MED GY IP 250 OP 250 PS 637: Performed by: HOSPITALIST

## 2022-07-15 PROCEDURE — 250N000013 HC RX MED GY IP 250 OP 250 PS 637: Performed by: INTERNAL MEDICINE

## 2022-07-15 PROCEDURE — 93010 ELECTROCARDIOGRAM REPORT: CPT | Mod: 76 | Performed by: INTERNAL MEDICINE

## 2022-07-15 PROCEDURE — 99239 HOSP IP/OBS DSCHRG MGMT >30: CPT | Performed by: HOSPITALIST

## 2022-07-15 RX ORDER — HYDROXYZINE HYDROCHLORIDE 25 MG/1
25 TABLET, FILM COATED ORAL 3 TIMES DAILY
Qty: 90 TABLET | Refills: 0 | Status: SHIPPED | OUTPATIENT
Start: 2022-07-15 | End: 2022-08-02

## 2022-07-15 RX ORDER — OLANZAPINE 10 MG/1
10 TABLET, ORALLY DISINTEGRATING ORAL AT BEDTIME
Qty: 30 TABLET | Refills: 0 | Status: SHIPPED | OUTPATIENT
Start: 2022-07-15 | End: 2022-08-02

## 2022-07-15 RX ADMIN — LISINOPRIL 20 MG: 20 TABLET ORAL at 08:42

## 2022-07-15 RX ADMIN — HYDROXYZINE HYDROCHLORIDE 25 MG: 25 TABLET, FILM COATED ORAL at 08:42

## 2022-07-15 RX ADMIN — HYDROCORTISONE: 0.5 CREAM TOPICAL at 08:42

## 2022-07-15 RX ADMIN — HYDROXYZINE HYDROCHLORIDE 15 MG: 10 TABLET ORAL at 16:58

## 2022-07-15 ASSESSMENT — ACTIVITIES OF DAILY LIVING (ADL)
ADLS_ACUITY_SCORE: 35
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
## 2022-07-15 NOTE — PLAN OF CARE
End of shift summary ([id number])    Pt Ox4. VSS on RA. Denied pain, CP, SI, hallucinations. PIV to left intact, SL. Up ad deonna in room, steady gait, denies dizziness. Slept comfortably this shift. Door alarms on for safety. Potential discharge 7/15, psych following. Will continue POC.     Goal Outcome Evaluation:    Plan of Care Reviewed With: patient     Overall Patient Progress: no change

## 2022-07-15 NOTE — PROGRESS NOTES
Care Management Follow Up    Length of Stay (days): 4    Expected Discharge Date: 07/15/2022     Concerns to be Addressed: discharge planning      Patient plan of care discussed at interdisciplinary rounds: Yes    Anticipated Discharge Disposition:  Home     Anticipated Discharge Services: OP psych/therapy follow up  Anticipated Discharge DME:      Referrals Placed by CM/SW:    Private pay costs discussed: Not applicable    Additional Information:  Reviewed pt's status and discussed in rounds. Per psych note on 7/14, pt does not meet criteria for commitment for MI. Appears plan is for pt to discharge home. Pt has f/u scheduled on 7/18 for teletherapy and 8/15 for telepsychiatry.     72 hr hold expires today @ 1453.    OTONIEL Salvador, LSW  Inpatient Care Coordination  [facility], [facility]  [phone]    OTONIEL Jaquez

## 2022-07-15 NOTE — TELEPHONE ENCOUNTER
First attempt to contact pt. Writer left a VM with TC contact info and encouraged a phone call back to schedule initial psychiatry appointment. Writer will postpone for tomorrow.    Yelena Dale  07/15/22  [id number]    ----- Message from Joshua Schneider, RN sent at 7/15/2022 10:31 AM CDT -----  Regarding: FW: Referral   Mental Health &Addiction (MH&A)Transition Clinic (TC):     Provides Patient Support While Waiting to Access Programmatic and Outpatient MH&A Care and Provides Select Crisis Assessment Services     NURSING Referral Review  _________________________________________    This RN has reviewed this Medication Management referral to the Transition Clinic and deemed the referral    Appropriate X   Inappropriate   Consulting     Based on the following criteria:    Pt has a psychiatric provider (or pending plan) in place for future prescribing: Yes:   Scheduled Appointment   Date: Monday, 8/15/2022  Time: 2:00 pm - 3:00 pm   Provider: Bismark Solitario MA, CNP,RN   Location: [facility], [address], [address], [place] [address]   Phone: [phone]   Type: Telepsychiatry         Timeframe until pt's scheduled psychiatry appointment is less than 6 months: Yes: ~ 1 month     Pt takes psychiatric medications: Yes: hydrOXYzine (ATARAX) tablet 25 mg, OLANZapine zydis (zyPREXA) ODT tab 10 mg, QUEtiapine (SEROquel) tablet 25 mg, haloperidol lactate (HALDOL) injection 2 mg, melatonin tablet 1 mg,     Pt's goals seem to align with this temporary service: Yes: Transition Clinic to bridge psychiatric care and psychiatric medication management until next Level of Care.         Any additional pertinent information regarding this referral: Recent ED visit on 7/11/22 and subsequent In Patient Mental health Admission.  Hx of Paranoia and delusional thinking.  Possible Schizophrenia. HTN, THC abuse, psoriasis, Acute renal failure.  Schizoaffective Disorder.  Patient has been on wait list for   "Inpatient bed.  Discharged 7/14/22.      Initial contact w/ patient/parent: TC Coordinator to contact this patient/patients guardian to schedule a New Person Visit with TC Provider Mariah Call.        Additional Scheduling Instructions for Transition Clinic Coordinator:     TC Coordinators:  This is a medication only  Referral.        Please schedule this patient with TC Provider Mariah Call at the first available New Person appointment available.  Patient was just discharged from the ED. He was paranoid and delusional and would benefit from TC services sooner than later.      TC Coordinator, please educate this (patient/ parent/guardian/facility staff member ) as to the purpose and benefit of the TC.      \"The Transition Clinic is a Temporary Service that helps to bridge the time to your next appointment.  It is not intended to be a long-term service and you are expecxted to attend your scheduled appointment with your next provider.      Patient/Parent/ Facility Staff Member verbalized understanding     If you need support between appointments, please call [phone] and let them know you're seen by Transition Clinic Psychiatry.  You may also send a [employer] message to reach us.         RN Signature  Joshua Schneider RN on 7/15/2022 at 10:28 AM  ----- Message -----  From: Yelena Dale  Sent: 7/14/2022   3:25 PM CDT  To: Transition [facility] Mustapha Calhoun  Subject: FW: Referral                                     Hello,    Med only-    Scheduled Appointment   Date: Monday, 8/15/2022  Time: 2:00 pm - 3:00 pm   Provider: Bismark Solitario MA, CNP,RN   Location: [facility], [address] [place] [address], [facility] [address], [place] [address]   Phone: [phone]   Type: Telepsychiatry       Thank you!  ----- Message -----  From: Gaviota Rod  Sent: 7/14/2022   3:10 PM CDT  To: Transition Clinic  Subject: Referral                                         Transition Clinic Referral   [place] Only   Limited " [facility]    Type of Referral:    ____Therapy Only   _x___Medication Only: Referral will automatically be declined if no next level of care scheduled. Suboxone and Opioid management referrals are automatically denied.  ____Therapy & Medication:  Referral will automatically be declined if no next level of care scheduled. Suboxone and Opioid management referrals are automatically denied.  ____Diagnostic Assessment     Suboxone and Opioid Management Referrals are Automatically Denied    TC Psychiatry cannot see patients who do not have active medical insurance: obtained PMI will in hospital for [employer]. He has this.     Referring Provider Name: BEATA NICHOLS    Clinician completing the assessment.BEATA NICHOLS      Referring Provider: TRIAGE & TRANSITION ([facility]) CLINICIAN     If known, referring provider contact name: BEATA NICHOLS  ; Phone Number: [phone]  Service Line/Location: C&L     Reason for Transition Clinic Referral: depression, anxiety and paranoid delusions    Scheduled Appointment  Date: Monday, 8/15/2022  Time: 2:00 pm - 3:00 pm  Provider: Bismark Solitario MA, CNP,RN  Location: [facility], [address], [place] [address]  Phone: [phone]  Type: Telepsychiatry    Patient Instructions  Please fill New Patient Form by using following link. All forms need to be completed 72 hours prior to the appointment date/time by going to [email or web address] Please call us on [phone] 96 hours prior to your scheduled appointment to confirm that you are able to attend. We will provide you information about how to log into video call software when you call.    Scheduled Appointment  Date: Monday, 7/18/2022  Time: 1:00 pm - 2:00 pm  Provider: Jossy Dixon  Location: [facility], [address] [place] [address]  Phone: [phone]  Type: Teletherapy    Patient Instructions  For all appointments: you will be sent  information to fill out the intake paperwork online. Please fill the paperwork prior to your appointment. For telehealth appointments: you will also be sent a zoom link to attend the appointment remotely.    What Would Be Helpful from the Transition Clinic: Please contact after discharge not before to avoid confusion.      Needs: NO    Does Patient Have Access to Technology: yes    Patient E-mail Address: No e-mail address on record    Current Patient Phone Number: [phone]    Clinician Gender Preference (if applicable): NO    BEATA NICHOLS

## 2022-07-15 NOTE — TELEPHONE ENCOUNTER
Mental Health &Addiction (MH&A)Transition Clinic (TC):     Provides Patient Support While Waiting to Access Programmatic and Outpatient MH&A Care and Provides Select Crisis Assessment Services     NURSING Referral Review  _________________________________________    This RN has reviewed this Medication Management referral to the Transition Clinic and deemed the referral   [x] Appropriate  [] Inappropriate   []Consulting     Based on the following criteria:    Pt has a psychiatric provider (or pending plan) in place for future prescribing: Yes:   Scheduled Appointment   Date: Monday, 8/15/2022  Time: 2:00 pm - 3:00 pm   Provider: Bismark Solitario MA, CNP,RN   Location: [facility], [address] [place] [address], [address], [place] [address]   Phone: [phone]   Type: Telepsychiatry         Timeframe until pt's scheduled psychiatry appointment is less than 6 months: Yes: ~ 1 month     Pt takes psychiatric medications: Yes: hydrOXYzine (ATARAX) tablet 25 mg, OLANZapine zydis (zyPREXA) ODT tab 10 mg, QUEtiapine (SEROquel) tablet 25 mg, haloperidol lactate (HALDOL) injection 2 mg, melatonin tablet 1 mg,     Pt's goals seem to align with this temporary service: Yes: Transition Clinic to bridge psychiatric care and psychiatric medication management until next Level of Care.         Any additional pertinent information regarding this referral: Recent ED visit on 7/11/22 and subsequent In Patient Mental health Admission.  Hx of Paranoia and delusional thinking.  Possible Schizophrenia. HTN, THC abuse, psoriasis, Acute renal failure.  Schizoaffective Disorder.  Patient has been on wait list for  Inpatient bed.  Discharged 7/14/22.      Initial contact w/ patient/parent: TC Coordinator to contact this patient/patients guardian to schedule a New Person Visit with TC Provider Mariah Call.        Additional Scheduling Instructions for Transition Clinic Coordinator:     TC Coordinators:  This is a medication only   "Referral.        Please schedule this patient with TC Provider Mariah Call at the first available New Person appointment available.  Patient was just discharged from the ED. He was paranoid and delusional and would benefit from TC services sooner than later.      TC Coordinator, please educate this (patient/ parent/guardian/facility staff member ) as to the purpose and benefit of the TC.      \"The Transition Clinic is a Temporary Service that helps to bridge the time to your next appointment.  It is not intended to be a long-term service and you are expecxted to attend your scheduled appointment with your next provider.      Patient/Parent/ Facility Staff Member verbalized understanding     If you need support between appointments, please call [phone] and let them know you're seen by Transition Clinic Psychiatry.  You may also send a [employer] message to reach us.         RN Signature  Joshua Schneider, RN on 7/15/2022 at 10:28 AM          Yelena Dale Transition Clinic Rn Pool  Hello,     Med only-     Scheduled Appointment   Date: Monday, 8/15/2022  Time: 2:00 pm - 3:00 pm   Provider: Bismark Solitario MA, CNP,RN   Location: [facility], [address] [place] [address], [address], [place] [address]   Phone: [phone]   Type: Telepsychiatry       Thank you!             Previous Messages       ----- Message -----   From: Gaviota Rod   Sent: 7/14/2022   3:10 PM CDT   To: Transition Clinic   Subject: Referral                                         Transition Clinic Referral   [place] Only   Limited [place] Availability     Type of Referral:     ____Therapy Only   _x___Medication Only: Referral will automatically be declined if no next level of care scheduled. Suboxone and Opioid management referrals are automatically denied.   ____Therapy & Medication:  Referral will automatically be declined if no next level of care scheduled. Suboxone and Opioid management referrals are " automatically denied.   ____Diagnostic Assessment     Suboxone and Opioid Management Referrals are Automatically Denied     TC Psychiatry cannot see patients who do not have active medical insurance: obtained PMI will in hospital for [employer]. He has this.     Referring Provider Name: BEATA NICHOLS     Clinician completing the assessment.BEATA NICHOLS       Referring Provider: TRIAGE & TRANSITION ([facility]) CLINICIAN     If known, referring provider contact name: BEATA NICHOLS   ; Phone Number: [phone]   Service Line/Location: C&L     Reason for Transition Clinic Referral: depression, anxiety and paranoid delusions       Patient Instructions   Please fill New Patient Form by using following link. All forms need to be completed 72 hours prior to the appointment date/time by going to [email or web address] Please call us on [phone] 96 hours prior to your scheduled appointment to confirm that you are able to attend. We will provide you information about how to log into video call software when you call.     Scheduled Appointment   Date: Monday, 7/18/2022  Time: 1:00 pm - 2:00 pm   Provider: Jossy Dixon   Location: [place] [address]   Phone: [phone]   Type: Teletherapy     Patient Instructions   For all appointments: you will be sent information to fill out the intake paperwork online. Please fill the paperwork prior to your appointment. For telehealth appointments: you will also be sent a zoom link to attend the appointment remotely.     What Would Be Helpful from the Transition Clinic: Please contact after discharge not before to avoid confusion.      Needs: NO     Does Patient Have Access to Technology: yes     Patient E-mail Address: No e-mail address on record     Current Patient Phone Number: [phone]     Clinician Gender Preference (if applicable): NO     BEATA NICHOLS

## 2022-07-15 NOTE — PROGRESS NOTES
BP (!) 155/92 (BP Location: Right arm, Patient Position: Supine)   Pulse 74   Temp 98.1  F (36.7  C) (Oral)   Resp 18   SpO2 99%       AVS reviewed with patient. Patient is in stable condition, VSS, no co pain/cp/sob. All discharge education reviewed with pt in regards to: diet, activity, safety, s/s to report, medications and rx, follow up appointments/care.  All questions answered. Pt denies any further questions or concerns. PIV removed. No complications.  All belongings returned. Meds sent with pt. Pt escorted to front door by [facility] staff.

## 2022-07-15 NOTE — PLAN OF CARE
Vitals VSS  Neuro A&Ox4  Respiratory 98% RA  Cardiac/Tele WDL  GI/ Continent   Skin Psoriasis on feet. Hydrocortisone cream applied to tops of feet  LDAs PIV SL   Diet Regular  Activity Independent   Plan Possible discharge when 72 hour hold expires. Continue to monitor.

## 2022-07-15 NOTE — PLAN OF CARE
Temp: 98.7  F (37.1  C) Temp src: Oral BP: (!) 147/93 Pulse: 59   Resp: 18 SpO2: 99 % O2 Device: None (Room air)      Patient A&Ox4, no complaints of pain. VSS. Patient still on 72hr hold until 1520 today. Psych is thinking that he is stable enough to discharge home and does not require inpatient treatment. Hospitalist aware of psych recommendations. Patient up ad deonna in room. Door alarm on. Care plan updated      Meghna Bello on 7/15/2022 at 2:27 PM

## 2022-07-15 NOTE — TELEPHONE ENCOUNTER
Patient called back on que as they are on a hold at [facility] and scheduled psychiatry appointment for 08/02/22 @ 10:30 am. Referral marked as complete. Tracker done.    Yelena Dale  07/15/22  [id number]

## 2022-07-18 LAB
ATRIAL RATE - MUSE: 51 BPM
DIASTOLIC BLOOD PRESSURE - MUSE: NORMAL MMHG
INTERPRETATION ECG - MUSE: NORMAL
P AXIS - MUSE: 18 DEGREES
PR INTERVAL - MUSE: 160 MS
QRS DURATION - MUSE: 106 MS
QT - MUSE: 462 MS
QTC - MUSE: 425 MS
R AXIS - MUSE: 53 DEGREES
SYSTOLIC BLOOD PRESSURE - MUSE: NORMAL MMHG
T AXIS - MUSE: 53 DEGREES
VENTRICULAR RATE- MUSE: 51 BPM

## 2022-07-20 LAB
ATRIAL RATE - MUSE: 49 BPM
DIASTOLIC BLOOD PRESSURE - MUSE: NORMAL MMHG
INTERPRETATION ECG - MUSE: NORMAL
P AXIS - MUSE: 13 DEGREES
PR INTERVAL - MUSE: 144 MS
QRS DURATION - MUSE: 108 MS
QT - MUSE: 464 MS
QTC - MUSE: 419 MS
R AXIS - MUSE: 39 DEGREES
SYSTOLIC BLOOD PRESSURE - MUSE: NORMAL MMHG
T AXIS - MUSE: 38 DEGREES
VENTRICULAR RATE- MUSE: 49 BPM

## 2022-07-31 ENCOUNTER — HEALTH MAINTENANCE LETTER (OUTPATIENT)
Age: 47
End: 2022-07-31

## 2022-08-02 ENCOUNTER — VIRTUAL VISIT (OUTPATIENT)
Dept: BEHAVIORAL HEALTH | Facility: CLINIC | Age: 47
End: 2022-08-02
Payer: MEDICAID

## 2022-08-02 DIAGNOSIS — F29 PSYCHOSIS, UNSPECIFIED PSYCHOSIS TYPE (H): ICD-10-CM

## 2022-08-02 PROCEDURE — 99205 OFFICE O/P NEW HI 60 MIN: CPT | Mod: GT | Performed by: NURSE PRACTITIONER

## 2022-08-02 RX ORDER — OLANZAPINE 10 MG/1
10 TABLET, ORALLY DISINTEGRATING ORAL AT BEDTIME
Qty: 30 TABLET | Refills: 2 | Status: SHIPPED | OUTPATIENT
Start: 2022-08-02

## 2022-08-02 RX ORDER — HYDROXYZINE HYDROCHLORIDE 25 MG/1
12.5 TABLET, FILM COATED ORAL 3 TIMES DAILY
Qty: 45 TABLET | Refills: 1 | Status: SHIPPED | OUTPATIENT
Start: 2022-08-02

## 2022-08-02 ASSESSMENT — PATIENT HEALTH QUESTIONNAIRE - PHQ9
SUM OF ALL RESPONSES TO PHQ QUESTIONS 1-9: 3
SUM OF ALL RESPONSES TO PHQ QUESTIONS 1-9: 3
10. IF YOU CHECKED OFF ANY PROBLEMS, HOW DIFFICULT HAVE THESE PROBLEMS MADE IT FOR YOU TO DO YOUR WORK, TAKE CARE OF THINGS AT HOME, OR GET ALONG WITH OTHER PEOPLE: NOT DIFFICULT AT ALL

## 2022-08-02 ASSESSMENT — ANXIETY QUESTIONNAIRES
8. IF YOU CHECKED OFF ANY PROBLEMS, HOW DIFFICULT HAVE THESE MADE IT FOR YOU TO DO YOUR WORK, TAKE CARE OF THINGS AT HOME, OR GET ALONG WITH OTHER PEOPLE?: NOT DIFFICULT AT ALL
1. FEELING NERVOUS, ANXIOUS, OR ON EDGE: NOT AT ALL
IF YOU CHECKED OFF ANY PROBLEMS ON THIS QUESTIONNAIRE, HOW DIFFICULT HAVE THESE PROBLEMS MADE IT FOR YOU TO DO YOUR WORK, TAKE CARE OF THINGS AT HOME, OR GET ALONG WITH OTHER PEOPLE: NOT DIFFICULT AT ALL
2. NOT BEING ABLE TO STOP OR CONTROL WORRYING: NOT AT ALL
4. TROUBLE RELAXING: NOT AT ALL
7. FEELING AFRAID AS IF SOMETHING AWFUL MIGHT HAPPEN: NOT AT ALL
6. BECOMING EASILY ANNOYED OR IRRITABLE: NOT AT ALL
3. WORRYING TOO MUCH ABOUT DIFFERENT THINGS: NOT AT ALL
5. BEING SO RESTLESS THAT IT IS HARD TO SIT STILL: NOT AT ALL
GAD7 TOTAL SCORE: 0
GAD7 TOTAL SCORE: 0
7. FEELING AFRAID AS IF SOMETHING AWFUL MIGHT HAPPEN: NOT AT ALL
GAD7 TOTAL SCORE: 0

## 2022-08-02 NOTE — PROGRESS NOTES
" This video/telephone visit will be conducted virtually between you and your provider. We have found that certain health care needs can be provided without the need for an in-person physical exam. This service lets us provide the care you need with a video /telephone conversation. If a prescription is necessary we can send it directly to your pharmacy. If lab work is needed we can place an order for that and you can then stop by our lab to have the test done at a later time.\"   Just as we bill insurance for in-person visits, we also bill insurance for video/telephone visits. If you have questions about your insurance coverage, we recommend that you speak with your insurance company.      Patient/Parent has given verbal consent for video/Telephone visit? yes    Patient would like the video visit invitation sent by: [employer]  Patient verified allergies, medications and pharmacy via phone. Patient states they are ready for visit.      Mental Health &Addiction (MH&A)Transition Clinic (TC):     Provides Patient Support While Waiting to Access Programmatic and Outpatient MH&A Care and Provides Select Crisis Assessment Services     INTAKE  ____________________________________________________    \"The Transition Clinic is a temporary psychiatry service that helps to bridge the time to your next appointment. It is not intended to be a long-term service and you are expected to attend your scheduled appointment with your next provider.\"  [x] Patient/Parent verbalized understanding    If you need support between appointments, please call [phone] and let them know you're seen by [facility] Psychiatry. You may also send a Know[employer] message to reach us.    General-     Most pressing MH needs at this time: pt reports no problems, states he is feeling a lot better since his hospital stay. 25 mg of Vistaril was making his drowsy but 12.5 mg TID has been very beneficial.       Any physical health conditions or diagnoses we " should be aware of or that are impacting you: recent   acute kidney injury secondary to rhabdomyolysis    Medications-     Injectable medications currently prescribed: none for MH  If yes, do you need an appointment for the next injection: NA    Any Controlled Substances that you are prescribed: none      Primary care provider: [facility]        PAVEL-7 scores: 0     PHQ-9 scores: 3        Anything the provider should be aware of for today's appointment: recently hospitalized for acute renal failure ( 7/11/22)    New (awaiting) Mental health provider or next programming: miguel Sofia Che @  [facility]    Date of scheduled apt: 8/15/22         Valerie Morris on August 2, 2022 at 9:25 AM     MH&A TC   NURSING Post-Appointment Chart-check:    Correct pharmacy verified with patient and updated in chart? [x] yes []no    Charge captured ? [] yes  [x] no    Medications ordered this visit were e-scribed.  Verified by order class [x] yes  [] no    List Medications:  Zyprexa and Atarax  Class: E-Prescribe      Medication changes or discontinuations were communicated to patient's pharmacy: [] yes  [x] no    UA collected [] yes  [x] no  [] n/a-virtual     Future appointment was made: [] yes  [] no  [x] n/a    Dictation completed at time of chart check: [x] yes  [] no    I have checked the documentation for today s encounters and the above information has been reviewed and completed.      Valerie Morris on August 3, 2022 at 1:45 PM

## 2022-08-02 NOTE — PATIENT INSTRUCTIONS
Continue:  Hydroxyzine 12.5 mg three times daily; TDD = 37.5 mg    Olanzapine/Zydis 10 mg at bedtime      ----------------------------    -You do not need to return to the Transition Clinic for medication management  -Please make sure to keep the appointment on:   8/15/2022  for longitudinal outpatient psychiatry services
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
## 2022-10-14 ENCOUNTER — HOSPITAL ENCOUNTER (EMERGENCY)
Facility: CLINIC | Age: 47
Discharge: HOME | End: 2022-10-14
Attending: EMERGENCY MEDICINE | Admitting: EMERGENCY MEDICINE
Payer: COMMERCIAL

## 2022-10-14 VITALS
SYSTOLIC BLOOD PRESSURE: 164 MMHG | OXYGEN SATURATION: 98 % | HEART RATE: 93 BPM | DIASTOLIC BLOOD PRESSURE: 120 MMHG | RESPIRATION RATE: 16 BRPM | TEMPERATURE: 97.8 F

## 2022-10-14 DIAGNOSIS — I10 UNCONTROLLED HYPERTENSION: ICD-10-CM

## 2022-10-14 LAB
ALBUMIN UR-MCNC: NEGATIVE MG/DL
ANION GAP SERPL CALCULATED.3IONS-SCNC: 12 MMOL/L (ref 7–15)
APPEARANCE UR: CLEAR
BASOPHILS # BLD AUTO: 0.1 10E3/UL (ref 0–0.2)
BASOPHILS NFR BLD AUTO: 1 %
BILIRUB UR QL STRIP: NEGATIVE
BUN SERPL-MCNC: 7.6 MG/DL (ref 6–20)
CALCIUM SERPL-MCNC: 9.3 MG/DL (ref 8.6–10)
CHLORIDE SERPL-SCNC: 101 MMOL/L (ref 98–107)
COLOR UR AUTO: NORMAL
CREAT SERPL-MCNC: 0.95 MG/DL (ref 0.67–1.17)
DEPRECATED HCO3 PLAS-SCNC: 26 MMOL/L (ref 22–29)
EOSINOPHIL # BLD AUTO: 0.3 10E3/UL (ref 0–0.7)
EOSINOPHIL NFR BLD AUTO: 3 %
ERYTHROCYTE [DISTWIDTH] IN BLOOD BY AUTOMATED COUNT: 12.7 % (ref 10–15)
GFR SERPL CREATININE-BSD FRML MDRD: >90 ML/MIN/1.73M2
GLUCOSE SERPL-MCNC: 98 MG/DL (ref 70–99)
GLUCOSE UR STRIP-MCNC: NEGATIVE MG/DL
HCT VFR BLD AUTO: 48.9 % (ref 40–53)
HGB BLD-MCNC: 16.3 G/DL (ref 13.3–17.7)
HGB UR QL STRIP: NEGATIVE
HOLD SPECIMEN: NORMAL
IMM GRANULOCYTES # BLD: 0 10E3/UL
IMM GRANULOCYTES NFR BLD: 0 %
KETONES UR STRIP-MCNC: NEGATIVE MG/DL
LEUKOCYTE ESTERASE UR QL STRIP: NEGATIVE
LYMPHOCYTES # BLD AUTO: 2 10E3/UL (ref 0.8–5.3)
LYMPHOCYTES NFR BLD AUTO: 18 %
MCH RBC QN AUTO: 30.9 PG (ref 26.5–33)
MCHC RBC AUTO-ENTMCNC: 33.3 G/DL (ref 31.5–36.5)
MCV RBC AUTO: 93 FL (ref 78–100)
MONOCYTES # BLD AUTO: 0.6 10E3/UL (ref 0–1.3)
MONOCYTES NFR BLD AUTO: 6 %
NEUTROPHILS # BLD AUTO: 8 10E3/UL (ref 1.6–8.3)
NEUTROPHILS NFR BLD AUTO: 72 %
NITRATE UR QL: NEGATIVE
NRBC # BLD AUTO: 0 10E3/UL
NRBC BLD AUTO-RTO: 0 /100
PH UR STRIP: 6.5 [PH] (ref 5–7)
PLATELET # BLD AUTO: 148 10E3/UL (ref 150–450)
POTASSIUM SERPL-SCNC: 3.9 MMOL/L (ref 3.4–5.3)
RBC # BLD AUTO: 5.27 10E6/UL (ref 4.4–5.9)
RBC URINE: 0 /HPF
SODIUM SERPL-SCNC: 139 MMOL/L (ref 136–145)
SP GR UR STRIP: 1.01 (ref 1–1.03)
TROPONIN T SERPL HS-MCNC: 11 NG/L
UROBILINOGEN UR STRIP-MCNC: NORMAL MG/DL
WBC # BLD AUTO: 10.9 10E3/UL (ref 4–11)
WBC URINE: <1 /HPF

## 2022-10-14 PROCEDURE — 81001 URINALYSIS AUTO W/SCOPE: CPT | Performed by: EMERGENCY MEDICINE

## 2022-10-14 PROCEDURE — 82310 ASSAY OF CALCIUM: CPT | Performed by: EMERGENCY MEDICINE

## 2022-10-14 PROCEDURE — 84484 ASSAY OF TROPONIN QUANT: CPT | Performed by: EMERGENCY MEDICINE

## 2022-10-14 PROCEDURE — 99284 EMERGENCY DEPT VISIT MOD MDM: CPT

## 2022-10-14 PROCEDURE — 85025 COMPLETE CBC W/AUTO DIFF WBC: CPT | Performed by: EMERGENCY MEDICINE

## 2022-10-14 PROCEDURE — 93005 ELECTROCARDIOGRAM TRACING: CPT

## 2022-10-14 PROCEDURE — 250N000013 HC RX MED GY IP 250 OP 250 PS 637: Performed by: EMERGENCY MEDICINE

## 2022-10-14 PROCEDURE — 36415 COLL VENOUS BLD VENIPUNCTURE: CPT | Performed by: EMERGENCY MEDICINE

## 2022-10-14 RX ORDER — AMLODIPINE BESYLATE 5 MG/1
5 TABLET ORAL DAILY
Qty: 30 TABLET | Refills: 0 | Status: SHIPPED | OUTPATIENT
Start: 2022-10-14 | End: 2022-11-13

## 2022-10-14 RX ORDER — AMLODIPINE BESYLATE 5 MG/1
5 TABLET ORAL ONCE
Status: COMPLETED | OUTPATIENT
Start: 2022-10-14 | End: 2022-10-14

## 2022-10-14 RX ADMIN — AMLODIPINE BESYLATE 5 MG: 5 TABLET ORAL at 18:54

## 2022-10-14 ASSESSMENT — ENCOUNTER SYMPTOMS
ABDOMINAL PAIN: 1
HEMATURIA: 0
DYSURIA: 1

## 2022-10-14 ASSESSMENT — ACTIVITIES OF DAILY LIVING (ADL): ADLS_ACUITY_SCORE: 35

## 2022-10-14 NOTE — ED TRIAGE NOTES
Presents with high blood pressure at home since yesterday as high as 190/130's.  Hypertensive in triage as well. Endorses feeling like his pressure is high but denies pain. On lisinopril 30 mg daily took an extra 20mg this afternoon when bp was still high on home monitor.  Hx Acute Kidney failure. Endorses increased urinary frequency.

## 2022-10-14 NOTE — ED PROVIDER NOTES
"  History   Chief Complaint:  Hypertension       The history is provided by the patient.      Chevy Morris is a 47 year old male who presents with hypertension. Patient reports that he has been experiencing hypertension since about a week and a half ago. He states that he found this out when getting a checkup at [facility] and was prescribed 30 mL lisinopril. Patient states that he had an episode yesterday (10/13/22) with blood pressure being 160/110. He shares that it \"skyrocketed\" today to 180/140. He notes that he took his prescribed amount of lisinopril this morning and then another 20 mg dose at 1530 when his blood pressure increased. Patient reports that along with hypertension, he has been feeling a mild pain around his kidney area that is relieved with urination. Patient shares that he has experienced mild dysuria and darker urine the past two days but denies hematuria.     Review of Systems   Gastrointestinal: Positive for abdominal pain.   Genitourinary: Positive for dysuria. Negative for hematuria.   All other systems reviewed and are negative.    Allergies:  No Known Allergies    Medications:  risankizumab-rzaa  Olanzapine  Hydroxyzine  Lisinopril     Past Medical History:     Psoriasis   Elevated LFTs  Elevated blood pressure  Low back pain  Obesity    Past Surgical History:    Nasal fracture    Family History:    Mother - bipolar disorder, breast cancer, lupus  Father - eczema, hypertension    Social History:  [facility]   Presents alone.  Presents via private vehicle.    Physical Exam     Patient Vitals for the past 24 hrs:   BP Temp Temp src Pulse Resp SpO2   10/14/22 1931 -- -- -- -- 16 --   10/14/22 1924 -- -- -- -- -- 98 %   10/14/22 1915 (!) 164/120 -- -- 93 -- 97 %   10/14/22 1900 (!) 165/110 -- -- 86 -- 97 %   10/14/22 1855 (!) 189/113 -- -- 97 -- 99 %   10/14/22 1840 (!) 176/121 -- -- -- -- --   10/14/22 1633 (!) 190/126 97.8  F (36.6  C) Temporal 112 18 98 % "       Physical Exam  Constitutional: Alert, attentive  HENT:    Nose: Nose normal.    Mouth/Throat: Oropharynx is clear, mucous membranes are moist   Eyes: EOM are normal.   CV: regular rate and rhythm; no murmurs, rubs or gallups  Chest: Effort normal and breath sounds normal.   GI:  There is no tenderness. No distension. Normal bowel sounds  MSK: Normal range of motion.   Neurological: Alert, attentive  Skin: Skin is warm and dry.      Emergency Department Course   ECG  ECG results from 10/14/22   EKG 12-lead, tracing only     Value    Systolic Blood Pressure     Diastolic Blood Pressure     Ventricular Rate 85    Atrial Rate 85    MN Interval 166    QRS Duration 88        QTc 445    P Axis 51    R AXIS 32    T Axis 49    Interpretation ECG      Sinus rhythm  Normal ECG  When compared with ECG of 15-JUL-2022 16:27,  Vent. rate has increased BY  36 BPM       Laboratory:  Labs Ordered and Resulted from Time of ED Arrival to Time of ED Departure   CBC WITH PLATELETS AND DIFFERENTIAL - Abnormal       Result Value    WBC Count 10.9      RBC Count 5.27      Hemoglobin 16.3      Hematocrit 48.9      MCV 93      MCH 30.9      MCHC 33.3      RDW 12.7      Platelet Count 148 (*)     % Neutrophils 72      % Lymphocytes 18      % Monocytes 6      % Eosinophils 3      % Basophils 1      % Immature Granulocytes 0      NRBCs per 100 WBC 0      Absolute Neutrophils 8.0      Absolute Lymphocytes 2.0      Absolute Monocytes 0.6      Absolute Eosinophils 0.3      Absolute Basophils 0.1      Absolute Immature Granulocytes 0.0      Absolute NRBCs 0.0     BASIC METABOLIC PANEL - Normal    Sodium 139      Potassium 3.9      Chloride 101      Carbon Dioxide (CO2) 26      Anion Gap 12      Urea Nitrogen 7.6      Creatinine 0.95      Calcium 9.3      Glucose 98      GFR Estimate >90     TROPONIN T, HIGH SENSITIVITY - Normal    Troponin T, High Sensitivity 11     ROUTINE UA WITH MICROSCOPIC - Normal    Color Urine Light Yellow       Appearance Urine Clear      Glucose Urine Negative      Bilirubin Urine Negative      Ketones Urine Negative      Specific Gravity Urine 1.009      Blood Urine Negative      pH Urine 6.5      Protein Albumin Urine Negative      Urobilinogen Urine Normal      Nitrite Urine Negative      Leukocyte Esterase Urine Negative      RBC Urine 0      WBC Urine <1       Emergency Department Course:     Reviewed:  I reviewed nursing notes, vitals, past medical history and Care Everywhere    Assessments:  1833 I obtained history and examined the patient as noted above.    I rechecked the patient and explained findings.     Interventions:  1854 Amlodipine. 5 mg. PO.     Disposition:  The patient was discharged to home.     Impression & Plan     Medical Decision Making:  This is a pleasant 47-year-old male who presents for evaluation of asymptomatic elevated blood pressure.  History with additional details as per above.  Screening labs show no evidence of underlying endorgan ischemia.  Counseled against excessive lisinopril use.  Blood pressure is improved with amlodipine.  Plan for restart lisinopril at 40 mg and add amlodipine 5 mg daily.  No indication for admission at this time.  Primary care follow-up for recheck in 3 to 5 days and return precautions for headache, chest pain, or any other concerns.        Diagnosis:    ICD-10-CM    1. Uncontrolled hypertension  I10           Discharge Medications:  Discharge Medication List as of 10/14/2022  7:26 PM      START taking these medications    Details   amLODIPine (NORVASC) 5 MG tablet Take 1 tablet (5 mg) by mouth daily for 30 days, Disp-30 tablet, R-0, E-Prescribe             Scribe Disclosure:  Geovany HATFIELD, am serving as a scribe at 6:32 PM on 10/14/2022 to document services personally performed by Jason Garcia MD based on my observations and the provider's statements to me.          Jason Garcia MD  10/14/22 [id number]

## 2022-10-15 ENCOUNTER — HEALTH MAINTENANCE LETTER (OUTPATIENT)
Age: 47
End: 2022-10-15

## 2022-10-17 LAB
ATRIAL RATE - MUSE: 85 BPM
DIASTOLIC BLOOD PRESSURE - MUSE: NORMAL MMHG
INTERPRETATION ECG - MUSE: NORMAL
P AXIS - MUSE: 51 DEGREES
PR INTERVAL - MUSE: 166 MS
QRS DURATION - MUSE: 88 MS
QT - MUSE: 374 MS
QTC - MUSE: 445 MS
R AXIS - MUSE: 32 DEGREES
SYSTOLIC BLOOD PRESSURE - MUSE: NORMAL MMHG
T AXIS - MUSE: 49 DEGREES
VENTRICULAR RATE- MUSE: 85 BPM

## 2022-10-24 ENCOUNTER — HOSPITAL ENCOUNTER (EMERGENCY)
Facility: CLINIC | Age: 47
Discharge: HOME | End: 2022-10-24
Attending: PHYSICIAN ASSISTANT | Admitting: PHYSICIAN ASSISTANT
Payer: COMMERCIAL

## 2022-10-24 ENCOUNTER — APPOINTMENT (OUTPATIENT)
Dept: GENERAL RADIOLOGY | Facility: CLINIC | Age: 47
End: 2022-10-24
Attending: PHYSICIAN ASSISTANT
Payer: COMMERCIAL

## 2022-10-24 VITALS
TEMPERATURE: 97.2 F | RESPIRATION RATE: 20 BRPM | HEART RATE: 105 BPM | DIASTOLIC BLOOD PRESSURE: 95 MMHG | WEIGHT: 228 LBS | OXYGEN SATURATION: 100 % | SYSTOLIC BLOOD PRESSURE: 185 MMHG

## 2022-10-24 DIAGNOSIS — J39.2 THROAT IRRITATION: ICD-10-CM

## 2022-10-24 PROCEDURE — 70360 X-RAY EXAM OF NECK: CPT

## 2022-10-24 PROCEDURE — 99283 EMERGENCY DEPT VISIT LOW MDM: CPT

## 2022-10-24 ASSESSMENT — ENCOUNTER SYMPTOMS
RHINORRHEA: 0
TROUBLE SWALLOWING: 0
SORE THROAT: 1

## 2022-10-24 ASSESSMENT — ACTIVITIES OF DAILY LIVING (ADL): ADLS_ACUITY_SCORE: 35
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
## 2022-10-25 NOTE — DISCHARGE INSTRUCTIONS
You were seen in the emergency department for throat irritation. Your physical examination and imaging are reassuring. Continue supportive cares at home including Tylenol for pain and cough drops for irritation. Follow-up with primary care as needed. For new or worsening symptoms, return to Emergency Department.

## 2023-01-11 ENCOUNTER — HOSPITAL ENCOUNTER (EMERGENCY)
Facility: CLINIC | Age: 48
Discharge: HOME | End: 2023-01-11
Attending: EMERGENCY MEDICINE | Admitting: EMERGENCY MEDICINE
Payer: COMMERCIAL

## 2023-01-11 VITALS
TEMPERATURE: 97.2 F | HEART RATE: 69 BPM | WEIGHT: 229 LBS | HEIGHT: 70 IN | OXYGEN SATURATION: 100 % | RESPIRATION RATE: 18 BRPM | DIASTOLIC BLOOD PRESSURE: 89 MMHG | BODY MASS INDEX: 32.78 KG/M2 | SYSTOLIC BLOOD PRESSURE: 148 MMHG

## 2023-01-11 DIAGNOSIS — F22 PARANOIA (H): ICD-10-CM

## 2023-01-11 LAB
ALBUMIN SERPL BCG-MCNC: 4.3 G/DL (ref 3.5–5.2)
ALP SERPL-CCNC: 68 U/L (ref 40–129)
ALT SERPL W P-5'-P-CCNC: 24 U/L (ref 10–50)
AMPHETAMINES UR QL SCN: ABNORMAL
ANION GAP SERPL CALCULATED.3IONS-SCNC: 11 MMOL/L (ref 7–15)
AST SERPL W P-5'-P-CCNC: 20 U/L (ref 10–50)
BARBITURATES UR QL SCN: ABNORMAL
BASOPHILS # BLD AUTO: 0 10E3/UL (ref 0–0.2)
BASOPHILS NFR BLD AUTO: 1 %
BENZODIAZ UR QL SCN: ABNORMAL
BILIRUB SERPL-MCNC: 0.5 MG/DL
BUN SERPL-MCNC: 6.3 MG/DL (ref 6–20)
BZE UR QL SCN: ABNORMAL
CALCIUM SERPL-MCNC: 8.7 MG/DL (ref 8.6–10)
CANNABINOIDS UR QL SCN: ABNORMAL
CHLORIDE SERPL-SCNC: 104 MMOL/L (ref 98–107)
CK SERPL-CCNC: 182 U/L (ref 39–308)
COHGB MFR BLD: 7.6 % (ref 0–2)
CREAT SERPL-MCNC: 0.95 MG/DL (ref 0.67–1.17)
DEPRECATED HCO3 PLAS-SCNC: 25 MMOL/L (ref 22–29)
EOSINOPHIL # BLD AUTO: 0.1 10E3/UL (ref 0–0.7)
EOSINOPHIL NFR BLD AUTO: 1 %
ERYTHROCYTE [DISTWIDTH] IN BLOOD BY AUTOMATED COUNT: 13.2 % (ref 10–15)
GFR SERPL CREATININE-BSD FRML MDRD: >90 ML/MIN/1.73M2
GLUCOSE SERPL-MCNC: 98 MG/DL (ref 70–99)
HCT VFR BLD AUTO: 44.7 % (ref 40–53)
HGB BLD-MCNC: 15.3 G/DL (ref 13.3–17.7)
IMM GRANULOCYTES # BLD: 0 10E3/UL
IMM GRANULOCYTES NFR BLD: 0 %
LYMPHOCYTES # BLD AUTO: 1.5 10E3/UL (ref 0.8–5.3)
LYMPHOCYTES NFR BLD AUTO: 17 %
MCH RBC QN AUTO: 31.4 PG (ref 26.5–33)
MCHC RBC AUTO-ENTMCNC: 34.2 G/DL (ref 31.5–36.5)
MCV RBC AUTO: 92 FL (ref 78–100)
MONOCYTES # BLD AUTO: 0.6 10E3/UL (ref 0–1.3)
MONOCYTES NFR BLD AUTO: 7 %
NEUTROPHILS # BLD AUTO: 6.1 10E3/UL (ref 1.6–8.3)
NEUTROPHILS NFR BLD AUTO: 74 %
NRBC # BLD AUTO: 0 10E3/UL
NRBC BLD AUTO-RTO: 0 /100
OPIATES UR QL SCN: ABNORMAL
PLATELET # BLD AUTO: 151 10E3/UL (ref 150–450)
POTASSIUM SERPL-SCNC: 3.5 MMOL/L (ref 3.4–5.3)
PROT SERPL-MCNC: 7.1 G/DL (ref 6.4–8.3)
RBC # BLD AUTO: 4.87 10E6/UL (ref 4.4–5.9)
SODIUM SERPL-SCNC: 140 MMOL/L (ref 136–145)
WBC # BLD AUTO: 8.3 10E3/UL (ref 4–11)

## 2023-01-11 PROCEDURE — 82375 ASSAY CARBOXYHB QUANT: CPT | Performed by: EMERGENCY MEDICINE

## 2023-01-11 PROCEDURE — 85004 AUTOMATED DIFF WBC COUNT: CPT | Performed by: EMERGENCY MEDICINE

## 2023-01-11 PROCEDURE — 90791 PSYCH DIAGNOSTIC EVALUATION: CPT

## 2023-01-11 PROCEDURE — 36415 COLL VENOUS BLD VENIPUNCTURE: CPT | Performed by: EMERGENCY MEDICINE

## 2023-01-11 PROCEDURE — 250N000013 HC RX MED GY IP 250 OP 250 PS 637: Performed by: EMERGENCY MEDICINE

## 2023-01-11 PROCEDURE — 80053 COMPREHEN METABOLIC PANEL: CPT | Performed by: EMERGENCY MEDICINE

## 2023-01-11 PROCEDURE — 82550 ASSAY OF CK (CPK): CPT | Performed by: EMERGENCY MEDICINE

## 2023-01-11 PROCEDURE — 99285 EMERGENCY DEPT VISIT HI MDM: CPT | Mod: 25

## 2023-01-11 PROCEDURE — 80307 DRUG TEST PRSMV CHEM ANLYZR: CPT | Performed by: EMERGENCY MEDICINE

## 2023-01-11 RX ORDER — OLANZAPINE 5 MG/1
5 TABLET, ORALLY DISINTEGRATING ORAL ONCE
Status: COMPLETED | OUTPATIENT
Start: 2023-01-11 | End: 2023-01-11

## 2023-01-11 RX ADMIN — OLANZAPINE 5 MG: 5 TABLET, ORALLY DISINTEGRATING ORAL at 06:00

## 2023-01-11 ASSESSMENT — COLUMBIA-SUICIDE SEVERITY RATING SCALE - C-SSRS
6. HAVE YOU EVER DONE ANYTHING, STARTED TO DO ANYTHING, OR PREPARED TO DO ANYTHING TO END YOUR LIFE?: NO
2. HAVE YOU ACTUALLY HAD ANY THOUGHTS OF KILLING YOURSELF?: NO
4. HAVE YOU HAD THESE THOUGHTS AND HAD SOME INTENTION OF ACTING ON THEM?: NO
3. HAVE YOU BEEN THINKING ABOUT HOW YOU MIGHT KILL YOURSELF?: YES
TOTAL  NUMBER OF ABORTED OR SELF INTERRUPTED ATTEMPTS PAST 3 MONTHS: NO
REASONS FOR IDEATION LIFETIME: DOES NOT APPLY
TOTAL  NUMBER OF ABORTED OR SELF INTERRUPTED ATTEMPTS LIFETIME: YES
2. HAVE YOU ACTUALLY HAD ANY THOUGHTS OF KILLING YOURSELF?: YES
TOTAL  NUMBER OF ABORTED OR SELF INTERRUPTED ATTEMPTS LIFETIME: 1
ATTEMPT LIFETIME: NO
1. HAVE YOU WISHED YOU WERE DEAD OR WISHED YOU COULD GO TO SLEEP AND NOT WAKE UP?: YES
REASONS FOR IDEATION PAST MONTH: DOES NOT APPLY
5. HAVE YOU STARTED TO WORK OUT OR WORKED OUT THE DETAILS OF HOW TO KILL YOURSELF? DO YOU INTEND TO CARRY OUT THIS PLAN?: NO
TOTAL  NUMBER OF INTERRUPTED ATTEMPTS LIFETIME: NO
1. IN THE PAST MONTH, HAVE YOU WISHED YOU WERE DEAD OR WISHED YOU COULD GO TO SLEEP AND NOT WAKE UP?: NO

## 2023-01-11 ASSESSMENT — ACTIVITIES OF DAILY LIVING (ADL)
ADLS_ACUITY_SCORE: 35

## 2023-01-11 ASSESSMENT — ENCOUNTER SYMPTOMS: HALLUCINATIONS: 1

## 2023-01-11 NOTE — DISCHARGE INSTRUCTIONS
Aftercare Plan    Telepsychiatry Appointment  Date: Friday, 1/13/2023  Time: 11:00 am - 12:00 pm  Provider: Bismark Solitario MA, CNP,RN  Location: [facility], [address] [place] [address], [address], [place] [address]  Phone: [phone]  Type: Telepsychiatry  Patient Instructions  Please fill New Patient Form by using following link. All forms need to be completed 72 hours prior to the appointment date/time by going to [email or web address] Please call us on [phone] 96 hours prior to your scheduled appointment to confirm that you are able to attend. We will provide you information about how to log into video call software when you call.    If I am feeling unsafe or I am in a crisis, I will:   Contact my established care providers   Call the National Suicide Prevention Lifeline: 988  Go to the nearest emergency room   Call 911     Warning signs that I or other people might notice when a crisis is developing for me:   Increased anxiety  Not sleeping well  Using edibles  Drinking alcohol  Chain smoking  Feeling paranoid  Having delusions    Things I am able to do on my own to cope or help me feel better:   Take medications  Go for a walk  Exercise  Eat healthy meals  Drink plenty of water  Play guitar     Things that I am able to do with others to cope or help me better:   Call friends  Call brothers  Workout    Things I can use or do for distraction:   Play guitar  Workout  Read  Watch TV  Listen to music     Changes I can make to support my mental health and wellness:   Abstain from all non prescribed mood altering substances including alcohol  Start therapy as scheduled for next week  Attend med management appointment on the 13th as scheduled  Exercise    People in my life that I can ask for help:   Either brother  Friends  Therapist    Your [facility] has a mental health crisis team you can call 24/7: [facility]  [phone]    Crisis Lines  Crisis Text Line  Text 022825  You will  "be connected with a trained live crisis counselor to provide support.    Por espanol, texto  ADAMA a 462855 o texto a 442-AYUDAME en WhatsApp    [employer] (LGBTQ Youth Crisis Line)  1.278.959.8504  text START to 372-018    [employer]  Fast Tracker  Linking people to mental health and substance use disorder resources  [employer]     [place] [facility] Warm Line  Peer to peer support  Monday thru Saturday, 12 pm to 10 pm  860.089.7124 or 0.998.826.9536  Text \"Support\" to 66611    National [place] on Mental Illness (DUNIA)  427.089.1455 or 1.888.DUNIA.HELPS    Mental Health Apps  My3  https://[employer].org/    VirtualHopeBox  https://[employer]/apps/virtual-hope-box/    Additional Information  Today you were seen by a licensed mental health professional through Triage and Transition services, [facility] ([facility])  for a crisis assessment in the Emergency Department at [facility].  It is recommended that you follow up with your established providers (psychiatrist, mental health therapist, and/or primary care doctor - as relevant) as soon as possible. Coordinators from [facility] will be calling you in the next 24-48 hours to ensure that you have the resources you need.  You can also contact [facility] coordinators directly at [phone]. You may have been scheduled for or offered an appointment with a mental health provider. [facility] maintains an extensive network of licensed behavioral health providers to connect patients with the services they need.  We do not charge providers a fee to participate in our referral network.  We match patients with providers based on a patient's specific needs, insurance coverage, and location.  Our first effort will be to refer you to a provider within your care system, and will utilize providers outside your care system as needed.    "

## 2023-01-11 NOTE — ED NOTES
All patient questions have been answered, no further questions at this time. Discharge paperwork was gone over with patient. Patient verbalizes understanding of discharge teaching, medications, when to return and the importance of follow up.  Patient verbalizes feeling safe returning home at this time.    Safety care plan was also discussed with patient, and patient is agreeable to following plan.
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
## 2023-01-11 NOTE — ED PROVIDER NOTES
This cooperative patient has been evaluated by our DEC .  There is no indication for inpatient treatment.  Is comfortable with plans for early outpatient follow-up, and he has been scheduled for a telepsychiatry visit for this coming Friday, January 13 at 11 AM.  They have been provided with an aftercare plan, along with suggested coping skills, and instructions for how to proceed if symptoms reescalate.  Patient is comfortable with this plan.     Ramiro Mooney MD  01/11/23 1129

## 2023-01-14 ENCOUNTER — HOSPITAL ENCOUNTER (EMERGENCY)
Facility: CLINIC | Age: 48
Discharge: HOME | End: 2023-01-15
Attending: EMERGENCY MEDICINE | Admitting: EMERGENCY MEDICINE
Payer: COMMERCIAL

## 2023-01-14 ENCOUNTER — APPOINTMENT (OUTPATIENT)
Dept: GENERAL RADIOLOGY | Facility: CLINIC | Age: 48
End: 2023-01-14
Attending: EMERGENCY MEDICINE
Payer: COMMERCIAL

## 2023-01-14 ENCOUNTER — APPOINTMENT (OUTPATIENT)
Dept: CT IMAGING | Facility: CLINIC | Age: 48
End: 2023-01-14
Attending: EMERGENCY MEDICINE
Payer: COMMERCIAL

## 2023-01-14 DIAGNOSIS — F22 PARANOIA (H): ICD-10-CM

## 2023-01-14 DIAGNOSIS — F22 DELUSION (H): ICD-10-CM

## 2023-01-14 DIAGNOSIS — R07.9 CHEST PAIN, UNSPECIFIED TYPE: ICD-10-CM

## 2023-01-14 LAB
ALBUMIN SERPL BCG-MCNC: 4.4 G/DL (ref 3.5–5.2)
ALBUMIN UR-MCNC: 10 MG/DL
ALP SERPL-CCNC: 76 U/L (ref 40–129)
ALT SERPL W P-5'-P-CCNC: 22 U/L (ref 10–50)
ANION GAP SERPL CALCULATED.3IONS-SCNC: 10 MMOL/L (ref 7–15)
APPEARANCE UR: CLEAR
AST SERPL W P-5'-P-CCNC: 19 U/L (ref 10–50)
BASOPHILS # BLD MANUAL: 0.1 10E3/UL (ref 0–0.2)
BASOPHILS NFR BLD MANUAL: 1 %
BILIRUB SERPL-MCNC: 0.4 MG/DL
BILIRUB UR QL STRIP: NEGATIVE
BUN SERPL-MCNC: 11 MG/DL (ref 6–20)
CALCIUM SERPL-MCNC: 8.6 MG/DL (ref 8.6–10)
CHLORIDE SERPL-SCNC: 102 MMOL/L (ref 98–107)
COLOR UR AUTO: YELLOW
CREAT SERPL-MCNC: 1 MG/DL (ref 0.67–1.17)
DEPRECATED HCO3 PLAS-SCNC: 26 MMOL/L (ref 22–29)
EOSINOPHIL # BLD MANUAL: 0.1 10E3/UL (ref 0–0.7)
EOSINOPHIL NFR BLD MANUAL: 1 %
ERYTHROCYTE [DISTWIDTH] IN BLOOD BY AUTOMATED COUNT: 13.1 % (ref 10–15)
GFR SERPL CREATININE-BSD FRML MDRD: >90 ML/MIN/1.73M2
GLUCOSE SERPL-MCNC: 92 MG/DL (ref 70–99)
GLUCOSE UR STRIP-MCNC: NEGATIVE MG/DL
HCT VFR BLD AUTO: 46.4 % (ref 40–53)
HGB BLD-MCNC: 15.7 G/DL (ref 13.3–17.7)
HGB UR QL STRIP: ABNORMAL
HOLD SPECIMEN: NORMAL
HOLD SPECIMEN: NORMAL
KETONES UR STRIP-MCNC: NEGATIVE MG/DL
LEUKOCYTE ESTERASE UR QL STRIP: NEGATIVE
LIPASE SERPL-CCNC: 34 U/L (ref 13–60)
LYMPHOCYTES # BLD MANUAL: 1.9 10E3/UL (ref 0.8–5.3)
LYMPHOCYTES NFR BLD MANUAL: 17 %
MCH RBC QN AUTO: 31.2 PG (ref 26.5–33)
MCHC RBC AUTO-ENTMCNC: 33.8 G/DL (ref 31.5–36.5)
MCV RBC AUTO: 92 FL (ref 78–100)
MONOCYTES # BLD MANUAL: 0.4 10E3/UL (ref 0–1.3)
MONOCYTES NFR BLD MANUAL: 4 %
MUCOUS THREADS #/AREA URNS LPF: PRESENT /LPF
NEUTROPHILS # BLD MANUAL: 8.4 10E3/UL (ref 1.6–8.3)
NEUTROPHILS NFR BLD MANUAL: 77 %
NITRATE UR QL: NEGATIVE
PH UR STRIP: 6 [PH] (ref 5–7)
PLAT MORPH BLD: ABNORMAL
PLATELET # BLD AUTO: 145 10E3/UL (ref 150–450)
POTASSIUM SERPL-SCNC: 3.6 MMOL/L (ref 3.4–5.3)
PROT SERPL-MCNC: 7.2 G/DL (ref 6.4–8.3)
RBC # BLD AUTO: 5.04 10E6/UL (ref 4.4–5.9)
RBC MORPH BLD: ABNORMAL
RBC URINE: 3 /HPF
SODIUM SERPL-SCNC: 138 MMOL/L (ref 136–145)
SP GR UR STRIP: 1.02 (ref 1–1.03)
TROPONIN T SERPL HS-MCNC: 14 NG/L
UROBILINOGEN UR STRIP-MCNC: 2 MG/DL
WBC # BLD AUTO: 10.9 10E3/UL (ref 4–11)
WBC URINE: 1 /HPF

## 2023-01-14 PROCEDURE — 84484 ASSAY OF TROPONIN QUANT: CPT | Performed by: EMERGENCY MEDICINE

## 2023-01-14 PROCEDURE — 74176 CT ABD & PELVIS W/O CONTRAST: CPT

## 2023-01-14 PROCEDURE — 80053 COMPREHEN METABOLIC PANEL: CPT | Performed by: EMERGENCY MEDICINE

## 2023-01-14 PROCEDURE — 85007 BL SMEAR W/DIFF WBC COUNT: CPT | Performed by: EMERGENCY MEDICINE

## 2023-01-14 PROCEDURE — 83690 ASSAY OF LIPASE: CPT | Performed by: EMERGENCY MEDICINE

## 2023-01-14 PROCEDURE — 85027 COMPLETE CBC AUTOMATED: CPT | Performed by: EMERGENCY MEDICINE

## 2023-01-14 PROCEDURE — 71046 X-RAY EXAM CHEST 2 VIEWS: CPT

## 2023-01-14 PROCEDURE — 81001 URINALYSIS AUTO W/SCOPE: CPT | Performed by: EMERGENCY MEDICINE

## 2023-01-14 PROCEDURE — 99285 EMERGENCY DEPT VISIT HI MDM: CPT | Mod: 25

## 2023-01-14 PROCEDURE — 36415 COLL VENOUS BLD VENIPUNCTURE: CPT | Performed by: EMERGENCY MEDICINE

## 2023-01-14 PROCEDURE — 93005 ELECTROCARDIOGRAM TRACING: CPT

## 2023-01-14 ASSESSMENT — ACTIVITIES OF DAILY LIVING (ADL)
ADLS_ACUITY_SCORE: 35
ADLS_ACUITY_SCORE: 35

## 2023-01-14 ASSESSMENT — ENCOUNTER SYMPTOMS
ARTHRALGIAS: 0
ABDOMINAL PAIN: 1
SHORTNESS OF BREATH: 1
DYSURIA: 0
HEADACHES: 0
NAUSEA: 0
DIAPHORESIS: 1
VOMITING: 0
HEMATURIA: 1

## 2023-01-15 VITALS
RESPIRATION RATE: 16 BRPM | WEIGHT: 236.77 LBS | BODY MASS INDEX: 33.97 KG/M2 | HEART RATE: 61 BPM | DIASTOLIC BLOOD PRESSURE: 105 MMHG | OXYGEN SATURATION: 98 % | SYSTOLIC BLOOD PRESSURE: 151 MMHG | TEMPERATURE: 98.6 F

## 2023-01-15 PROCEDURE — 90791 PSYCH DIAGNOSTIC EVALUATION: CPT

## 2023-01-15 ASSESSMENT — ACTIVITIES OF DAILY LIVING (ADL): ADLS_ACUITY_SCORE: 35

## 2023-01-15 ASSESSMENT — COLUMBIA-SUICIDE SEVERITY RATING SCALE - C-SSRS
TOTAL  NUMBER OF ABORTED OR SELF INTERRUPTED ATTEMPTS SINCE LAST CONTACT: NO
SUICIDE, SINCE LAST CONTACT: NO
ATTEMPT SINCE LAST CONTACT: NO
6. HAVE YOU EVER DONE ANYTHING, STARTED TO DO ANYTHING, OR PREPARED TO DO ANYTHING TO END YOUR LIFE?: NO
TOTAL  NUMBER OF INTERRUPTED ATTEMPTS SINCE LAST CONTACT: NO
1. SINCE LAST CONTACT, HAVE YOU WISHED YOU WERE DEAD OR WISHED YOU COULD GO TO SLEEP AND NOT WAKE UP?: NO

## 2023-01-15 NOTE — ED PROVIDER NOTES
Patient was signed out to me pending DEC evaluation.  DEC did meet with the patient and they do have a tight follow-up plan as outlined in the DEC note.  She feels the patient is safe to discharge home.  Have not heard otherwise from the provider signed out to me.  Has been cooperative his entire time here will be discharged home with plans as per the DEC .     Aren Mead MD  01/15/23 0215
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
## 2023-01-15 NOTE — ED NOTES
This writer attempted to gather collateral from Jose whose relationship to the patient is brother. Their phone number is [phone]. [facility] req return call.

## 2023-01-15 NOTE — ED PROVIDER NOTES
"  History     Chief Complaint:  Flank Pain     HPI   Chevy Morris is a 47 year old male tobacco smoker who presents with right flank pain and concerns that \"I'm having a heart attack.\" He mentions a previous ER visit 2 days ago with findings that his carbon monoxide levels were elevated, but added that his \"detectors at home didn't go off\" at home. He explains that he suffered from acute renal failure in June, 2022 and reports right \"severe kidney pain\" presently with concerns of being poisoned. The pain started last night with him \"sweating out of [his] palms.\" He reports that he felt his \"heart was weak\" and that he had a \"slower respiratory rate.\" Presently, the pain has subsided slightly after eating, although he feels he is not \"breathing at full capacity.\" He notes hematuria earlier this morning and that his \"feet and calves seem atrophied.\" He has a history of hypertension but denies history of MI. He denies fever, chills, dysuria, headache, nausea, vomiting, chest pain, or leg pain/swelling. Good PO intake. Of note, he mentions a history of harrassment towards him \"since George Floyd\" but does not elaborate further.     Independent Historian: Yes.    Review of External Notes:   Reviewed previous ED visit from the 1/11/2023    ROS:  Review of Systems   Constitutional: Positive for diaphoresis.   Respiratory: Positive for shortness of breath.    Cardiovascular: Negative for chest pain and leg swelling.   Gastrointestinal: Positive for abdominal pain (Right flank). Negative for nausea and vomiting.   Genitourinary: Positive for hematuria. Negative for dysuria.   Musculoskeletal: Negative for arthralgias (Leg pain).   Neurological: Negative for headaches.     Allergies:  No Known Allergies     Medications:    Norvasc  Atarax  Zyprexa  Skyrizi  Humira    Past Medical History:    Psoriasis  Obesity  Paranoia  Hypertension  Psychosis  Acute renal failure    Past Surgical History:    Fracture, nasal with reduction "     Family History:    Father - eczema, hypertension  Mother - bipolar disorder, breast cancer, lupus  Brother - depression, obesity    Social History:  Patient presents to the ED alone.  [facility]     Physical Exam     Patient Vitals for the past 24 hrs:   BP Temp Pulse Resp SpO2 Weight   01/14/23 2130 (!) 167/113 -- 78 -- 95 % --   01/14/23 2103 (!) 190/111 -- 83 -- 93 % --   01/14/23 1952 (!) 179/104 98.6  F (37  C) 96 15 95 % 107.4 kg (236 lb 12.4 oz)      Physical Exam  General: Patient is awake, alert  Head: The scalp, face, and head appear normal  Eyes: The pupils are equal, round, and reactive to light. Conjunctivae and sclerae are normal  ENT: External acoustic canals are normal. The oropharynx is normal without erythema. Uvula is in the midline  Neck: Normal range of motion.   CV: Regular rate and rhythm.   Resp: Lungs are clear without wheezes or rales. No respiratory distress.   GI: Abdomen is soft, no rigidity, guarding, or rebound. No distension. No tenderness to palpation in any quadrant.     MS: Normal tone. Joints grossly normal without effusions. No asymmetric leg swelling, calf or thigh tenderness.    Skin: No rash or lesions noted. Normal capillary refill noted  Neuro: Speech is normal and fluent. Face is symmetric. Moving all extremities.   Psych: Anxious.  Paranoid with multiple delusions.    Emergency Department Course     ECG  ECG taken at 2037, ECG read at 2040  Normal sinus rhythm  Normal ECG  Rate 79 bpm. OH interval 176 ms. QRS duration 88 ms. QT/QTc 400/458 ms. P-R-T axes 41 22 49.     Imaging:  XR Chest 2 Views   Final Result   IMPRESSION: No acute cardiopulmonary findings. Minimal hypertrophic changes scattered in the thoracic spine.      CT Abdomen Pelvis w/o Contrast   Final Result   IMPRESSION:    1.  No acute abnormalities seen to explain patient's right flank pain.      2.  Mild findings of diverticulosis with no evidence for diverticulitis.      3.   Bilateral spondylolysis at L5 with no evidence for spondylolisthesis            Report per radiology    Laboratory:  Labs Ordered and Resulted from Time of ED Arrival to Time of ED Departure   ROUTINE UA WITH MICROSCOPIC REFLEX TO CULTURE - Abnormal       Result Value    Color Urine Yellow      Appearance Urine Clear      Glucose Urine Negative      Bilirubin Urine Negative      Ketones Urine Negative      Specific Gravity Urine 1.025      Blood Urine Trace (*)     pH Urine 6.0      Protein Albumin Urine 10 (*)     Urobilinogen Urine 2.0      Nitrite Urine Negative      Leukocyte Esterase Urine Negative      Mucus Urine Present (*)     RBC Urine 3 (*)     WBC Urine 1     CBC WITH PLATELETS AND DIFFERENTIAL - Abnormal    WBC Count        RBC Count 5.04      Hemoglobin 15.7      Hematocrit 46.4      MCV 92      MCH 31.2      MCHC 33.8      RDW 13.1      Platelet Count 145 (*)    COMPREHENSIVE METABOLIC PANEL - Normal    Sodium 138      Potassium 3.6      Chloride 102      Carbon Dioxide (CO2) 26      Anion Gap 10      Urea Nitrogen 11.0      Creatinine 1.00      Calcium 8.6      Glucose 92      Alkaline Phosphatase 76      AST 19      ALT 22      Protein Total 7.2      Albumin 4.4      Bilirubin Total 0.4      GFR Estimate >90     LIPASE - Normal    Lipase 34     TROPONIN T, HIGH SENSITIVITY - Normal    Troponin T, High Sensitivity 14        Emergency Department Course & Assessments:    PSS-3    Date and Time Over the past 2 weeks have you felt down, depressed, or hopeless? Over the past 2 weeks have you had thoughts of killing yourself? Have you ever attempted to kill yourself? When did this last happen? User   01/14/23 1936 yes yes no -- ALVARO FERRARO-SSRS ([place])    Date and Time Q1 Wished to be Dead (Past Month) Q2 Suicidal Thoughts (Past Month) Q3 Suicidal Thought Method Q4 Suicidal Intent without Specific Plan Q5 Suicide Intent with Specific Plan Q6 Suicide Behavior (Lifetime) Within the Past 3 Months? RETIRED:  Level of Risk per Screen Screening Not Complete User   01/14/23 1936 yes yes no no no no -- -- -- KRK              Suicide assessment completed by mental health (D.E.C., LCSW, etc.)    Consultations/Discussion of Management or Tests:  ED Course as of 01/14/23 2209   Sat Jan 14, 2023 2026 I obtained history and examined the patient as noted above.     Disposition:  Care of the patient was transferred to my colleague Dr. Mead pending DEC evaluation and medical clearance.    Impression & Plan      Medical Decision Making:  Patient is a 47-year-old gentleman with known history of delusions and paranoia who presents to the emergency department with right-sided flank pain, chest pain and paranoia.  Please see HPI for full details.  On initial evaluation he is mildly hypertensive but otherwise hemodynamically stable with no vital signs.  He is afebrile.  Saturating well on room air.  Patient is concerned he may have had a heart attack or acute kidney injury thus his presentation to the emergency department.  Also reports that he is having ongoing concerns that people are after him and trying to poison him.  Work-up in the emergency department shows no signs concerning for ACS or acute kidney injury.  CT scan shows no signs of ureterolithiasis or additional sinister pathology.  Patient will require DEC evaluation due to his ongoing paranoia's and delusions.  However he is not currently on a hold as he is not suicidal or homicidal.    Diagnosis:    ICD-10-CM    1. Chest pain, unspecified type  R07.9       2. Paranoia (H)  F22       3. Delusion (H)  F22           Scribe Disclosure:  I, Lester Bender, am serving as a scribe at 8:29 PM on 1/14/2023 to document services personally performed by Raghavendra Gudino MD based on my observations and the provider's statements to me.     I, Tyra Martinez, am serving as a scribe at 8:55 PM on 1/14/2023 to document services personally performed by Raghavendra Gudino MD  based on my observations and the provider's statements to me.    1/14/2023   Raghavendra Gudino MD Battista, Christopher Joseph, MD  01/14/23 8015

## 2023-01-15 NOTE — DISCHARGE INSTRUCTIONS
Aftercare Plan     Today you were seen by a licensed mental health professional through Triage and Transition services, [facility] ([facility])  for a crisis assessment in the Emergency Department at [facility].  It is recommended that you follow up with your established providers (psychiatrist, mental health therapist, and/or primary care doctor - as relevant) as soon as possible. Coordinators from [facility] will be calling you in the next 24-48 hours to ensure that you have the resources you need.  You can also contact [facility] coordinators directly at [phone].     You may have been scheduled for or offered an appointment with a mental health provider. [facility] maintains an extensive network of licensed behavioral health providers to connect patients with the services they need.  We do not charge providers a fee to participate in our referral network.  We match patients with providers based on a patient's specific needs, insurance coverage, and location.  Our first effort will be to refer you to a provider within your care system, and will utilize providers outside your care system as needed.      Telepsychiatry Appointment  Date: Rescheduled for Monday, 1/16/2022   Provider: Bismark Solitario MA, CNP,RN  Location: [facility], [address], [address], [place] [address]  Phone: [phone]  Type: Telepsychiatry  Patient Instructions -- you have completed the below, per report.  Thank you   Please fill New Patient Form by using following link. All forms need to be completed 72 hours prior to the appointment date/time by going to [email or web address] Please call us on [phone] 96 hours prior to your scheduled appointment to confirm that you are able to attend. We will provide you information about how to log into video call software when you call.     If I am feeling unsafe or I am in a crisis, I will:   Contact my established care providers   Call the National Suicide  "Prevention Lifeline: 988  Go to the nearest emergency room   Call 911   [facility] Crisis Line Number: 693-231-6975      Warning signs that I or other people might notice when a crisis is developing for me:   Increased anxiety  Not sleeping well  Using edibles  Drinking alcohol  Chain smoking  Feeling paranoid  Having delusions     Things I am able to do on my own to cope or help me feel better:   Check out \"free short mindfulness meditations\" or \"short, free Headspace meditations\" on YouTube for anxiety management and healthy relaxation  Take medications  Go for a walk  Exercise  Eat healthy meals  Drink plenty of water  Play guitar -- Music   Watch TV      Things that I am able to do with others to cope or help me better:   Call friends  Call brothers  Workout     Things I can use or do for distraction:   Play guitar  Workout  Read  Watch TV  Listen to music      Changes I can make to support my mental health and wellness:   Abstain from all non prescribed mood altering substances including alcohol  Start therapy as scheduled for next week  Attend med management appointment as re-scheduled  Exercise     People in my life that I can ask for help:   Either brother  Friends  Therapist     Your [facility] has a mental health crisis team you can call 24/7: [facility] Crisis  [phone]     Crisis Lines  Crisis Text Line  Text 594863  You will be connected with a trained live crisis counselor to provide support.     Por espanol, texto  ADAMA a 712868 o texto a 442-AYUDAME en WhatsApp     [employer] (LGBTQ Youth Crisis Line)  7.191.644.4410  text START to 433-583     Community Resources  Fast Tracker  Linking people to mental health and substance use disorder resources  [employer]      [place] Mental [facility] Warm Line  Peer to peer support  Monday thru Saturday, 12 pm to 10 pm  774.279.1616 or 1.487.685.9665  Text \"Support\" to 54357     National [place] on Mental Illness (DUNIA)  " 932.542.1030 or 1.888.DUNIA.HELPS     Mental Health Apps  My3  https://myHealth-Connectedpp.org/     VirtualHopeBox  https://[employer].org/apps/virtual-hope-box/
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
## 2023-01-16 LAB
ATRIAL RATE - MUSE: 79 BPM
DIASTOLIC BLOOD PRESSURE - MUSE: NORMAL MMHG
INTERPRETATION ECG - MUSE: NORMAL
P AXIS - MUSE: 41 DEGREES
PR INTERVAL - MUSE: 176 MS
QRS DURATION - MUSE: 88 MS
QT - MUSE: 400 MS
QTC - MUSE: 458 MS
R AXIS - MUSE: 22 DEGREES
SYSTOLIC BLOOD PRESSURE - MUSE: NORMAL MMHG
T AXIS - MUSE: 59 DEGREES
VENTRICULAR RATE- MUSE: 79 BPM

## 2023-01-18 ENCOUNTER — APPOINTMENT (OUTPATIENT)
Dept: GENERAL RADIOLOGY | Facility: CLINIC | Age: 48
End: 2023-01-18
Attending: EMERGENCY MEDICINE
Payer: COMMERCIAL

## 2023-01-18 ENCOUNTER — HOSPITAL ENCOUNTER (EMERGENCY)
Facility: CLINIC | Age: 48
Discharge: HOME | End: 2023-01-18
Attending: EMERGENCY MEDICINE | Admitting: EMERGENCY MEDICINE
Payer: COMMERCIAL

## 2023-01-18 VITALS
DIASTOLIC BLOOD PRESSURE: 93 MMHG | RESPIRATION RATE: 16 BRPM | TEMPERATURE: 97.4 F | OXYGEN SATURATION: 96 % | HEART RATE: 72 BPM | SYSTOLIC BLOOD PRESSURE: 158 MMHG

## 2023-01-18 DIAGNOSIS — I10 HYPERTENSION, UNSPECIFIED TYPE: ICD-10-CM

## 2023-01-18 DIAGNOSIS — R07.9 CHEST PAIN, UNSPECIFIED TYPE: ICD-10-CM

## 2023-01-18 LAB
ANION GAP SERPL CALCULATED.3IONS-SCNC: 12 MMOL/L (ref 7–15)
ATRIAL RATE - MUSE: 56 BPM
BASOPHILS # BLD AUTO: 0.1 10E3/UL (ref 0–0.2)
BASOPHILS NFR BLD AUTO: 1 %
BUN SERPL-MCNC: 9.5 MG/DL (ref 6–20)
CALCIUM SERPL-MCNC: 8.7 MG/DL (ref 8.6–10)
CHLORIDE SERPL-SCNC: 101 MMOL/L (ref 98–107)
CREAT SERPL-MCNC: 0.94 MG/DL (ref 0.67–1.17)
DEPRECATED HCO3 PLAS-SCNC: 24 MMOL/L (ref 22–29)
DIASTOLIC BLOOD PRESSURE - MUSE: NORMAL MMHG
EOSINOPHIL # BLD AUTO: 0.2 10E3/UL (ref 0–0.7)
EOSINOPHIL NFR BLD AUTO: 3 %
ERYTHROCYTE [DISTWIDTH] IN BLOOD BY AUTOMATED COUNT: 13 % (ref 10–15)
GFR SERPL CREATININE-BSD FRML MDRD: >90 ML/MIN/1.73M2
GLUCOSE SERPL-MCNC: 113 MG/DL (ref 70–99)
HCT VFR BLD AUTO: 46.6 % (ref 40–53)
HGB BLD-MCNC: 15.9 G/DL (ref 13.3–17.7)
HOLD SPECIMEN: NORMAL
HOLD SPECIMEN: NORMAL
IMM GRANULOCYTES # BLD: 0 10E3/UL
IMM GRANULOCYTES NFR BLD: 0 %
INTERPRETATION ECG - MUSE: NORMAL
LYMPHOCYTES # BLD AUTO: 1.5 10E3/UL (ref 0.8–5.3)
LYMPHOCYTES NFR BLD AUTO: 21 %
MCH RBC QN AUTO: 31.4 PG (ref 26.5–33)
MCHC RBC AUTO-ENTMCNC: 34.1 G/DL (ref 31.5–36.5)
MCV RBC AUTO: 92 FL (ref 78–100)
MONOCYTES # BLD AUTO: 0.5 10E3/UL (ref 0–1.3)
MONOCYTES NFR BLD AUTO: 6 %
NEUTROPHILS # BLD AUTO: 4.8 10E3/UL (ref 1.6–8.3)
NEUTROPHILS NFR BLD AUTO: 69 %
NRBC # BLD AUTO: 0 10E3/UL
NRBC BLD AUTO-RTO: 0 /100
P AXIS - MUSE: 7 DEGREES
PLATELET # BLD AUTO: 148 10E3/UL (ref 150–450)
POTASSIUM SERPL-SCNC: 4 MMOL/L (ref 3.4–5.3)
PR INTERVAL - MUSE: 144 MS
QRS DURATION - MUSE: 88 MS
QT - MUSE: 454 MS
QTC - MUSE: 438 MS
R AXIS - MUSE: 70 DEGREES
RBC # BLD AUTO: 5.06 10E6/UL (ref 4.4–5.9)
SODIUM SERPL-SCNC: 137 MMOL/L (ref 136–145)
SYSTOLIC BLOOD PRESSURE - MUSE: NORMAL MMHG
T AXIS - MUSE: 85 DEGREES
TROPONIN T SERPL HS-MCNC: 7 NG/L
VENTRICULAR RATE- MUSE: 56 BPM
WBC # BLD AUTO: 7 10E3/UL (ref 4–11)

## 2023-01-18 PROCEDURE — 93005 ELECTROCARDIOGRAM TRACING: CPT

## 2023-01-18 PROCEDURE — 80048 BASIC METABOLIC PNL TOTAL CA: CPT | Performed by: EMERGENCY MEDICINE

## 2023-01-18 PROCEDURE — 71046 X-RAY EXAM CHEST 2 VIEWS: CPT

## 2023-01-18 PROCEDURE — 84484 ASSAY OF TROPONIN QUANT: CPT | Performed by: EMERGENCY MEDICINE

## 2023-01-18 PROCEDURE — 85025 COMPLETE CBC W/AUTO DIFF WBC: CPT | Performed by: EMERGENCY MEDICINE

## 2023-01-18 PROCEDURE — 36415 COLL VENOUS BLD VENIPUNCTURE: CPT | Performed by: EMERGENCY MEDICINE

## 2023-01-18 PROCEDURE — 99285 EMERGENCY DEPT VISIT HI MDM: CPT | Mod: 25

## 2023-01-18 ASSESSMENT — ENCOUNTER SYMPTOMS
VOMITING: 0
DIAPHORESIS: 0
ARTHRALGIAS: 1
NAUSEA: 0
CHEST TIGHTNESS: 1
SHORTNESS OF BREATH: 1

## 2023-01-18 NOTE — ED TRIAGE NOTES
chest pain early this morning when the patient woke up to smoke a cigarette. Pain started in left arm, then moved to chest. Also had shortness of breath at the time. Went to , EKG completed that was normal per patient report. No current chest pain, has some shortness of breath. In triage, respirations are even, unlabored. sats 95%.  gave patient 4 baby aspirin.

## 2023-01-18 NOTE — ED PROVIDER NOTES
"  History     Chief Complaint:  Chest Pain and Shortness of Breath       The history is provided by the patient.      Chevy Morris is a 47 year old male with hx of hypertension who presents following an episode of left arm pain, chest tightness, and shortness of breath lasting a few seconds last night (0100). He also reports lingering \"weakness\" in his chest. He was seen in urgent care today, where he was given 4 baby aspirin. He has concern for heart attack. Denies abdominal pain, diaphoresis, nausea, and vomiting. No fever, chills, cough, currently dyspnea or other symptoms.  Denies personal hx of cancer, diabetes mellitus, blood clots, or heart problems. Denies recent travel. Denies family history of heart attack below the age of 60.     Independent Historian: Yes     Review of External Notes: I reviewed Care Everywhere.      ROS:  Review of Systems   Constitutional: Negative for diaphoresis.   Respiratory: Positive for chest tightness and shortness of breath.    Gastrointestinal: Negative for nausea and vomiting.   Musculoskeletal: Positive for arthralgias (left arm pain).   All other systems reviewed and are negative.    Allergies:  No Known Allergies     Medications:    Norvasc  Atarax  Lisinopril   Zyprexa  Skyrizi  Aspirin 81 mg   Humira  Valtrex    Past Medical History:    Psoriasis  Hypertension     Past Surgical History:    Nasal fracture reduction      Family History:    Father- eczema, hypertension   Mother- bipolar disorder, breast cancer, lupus  Brother- depression, obesity    Social History:  The patient presents to the ED alone via private vehicle.  He is a social drinker.  PCP: [facility]     Physical Exam     Patient Vitals for the past 24 hrs:   BP Temp Temp src Pulse Resp SpO2   01/18/23 1615 (!) 158/91 -- -- -- -- --   01/18/23 1339 (!) 180/97 97.4  F (36.3  C) Temporal 84 16 95 %        Physical Exam  Nursing note and vitals reviewed.  Constitutional: Well nourished. " Resting comfortably.   Eyes: Conjunctiva normal.  Pupils are equal, round, and reactive to light.   ENT: Nose normal. Mucous membranes pink and moist.    Neck: Normal range of motion.  CVS: Normal rate, regular rhythm.  Normal heart sounds.  No murmur.  Pulmonary: Lungs clear to auscultation bilaterally. No wheezes/rales/rhonchi.  GI: Abdomen soft. Nontender, nondistended. No rigidity or guarding.    MSK: No calf tenderness or swelling.  Neuro: Alert. Follows simple commands.  Skin: Skin is warm and dry. No rash noted.   Psychiatric: Normal affect.       Emergency Department Course   ECG:  ECG results from 01/18/23   EKG 12-lead, tracing only     Value    Systolic Blood Pressure     Diastolic Blood Pressure     Ventricular Rate 56    Atrial Rate 56    NY Interval 144    QRS Duration 88        QTc 438    P Axis 7    R AXIS 70    T Axis 85    Interpretation ECG      Sinus bradycardia  Otherwise normal ECG  When compared with ECG of 14-JAN-2023 20:37,  No significant change was found  Confirmed by - EMERGENCY ROOM, PHYSICIAN ([id number]),  YOKASTA HERNANDEZ ([id number]) on 1/18/2023 3:02:19 PM         Imaging:  Chest XR,  PA & LAT   Final Result   IMPRESSION: No acute cardiopulmonary disease.      BHARTI BUSTAMANTE MD            SYSTEM ID:  [id number]         Report per radiology    Laboratory:  Labs Ordered and Resulted from Time of ED Arrival to Time of ED Departure   BASIC METABOLIC PANEL - Abnormal       Result Value    Sodium 137      Potassium 4.0      Chloride 101      Carbon Dioxide (CO2) 24      Anion Gap 12      Urea Nitrogen 9.5      Creatinine 0.94      Calcium 8.7      Glucose 113 (*)     GFR Estimate >90     CBC WITH PLATELETS AND DIFFERENTIAL - Abnormal    WBC Count 7.0      RBC Count 5.06      Hemoglobin 15.9      Hematocrit 46.6      MCV 92      MCH 31.4      MCHC 34.1      RDW 13.0      Platelet Count 148 (*)     % Neutrophils 69      % Lymphocytes 21      % Monocytes 6      % Eosinophils 3      % Basophils 1    "   % Immature Granulocytes 0      NRBCs per 100 WBC 0      Absolute Neutrophils 4.8      Absolute Lymphocytes 1.5      Absolute Monocytes 0.5      Absolute Eosinophils 0.2      Absolute Basophils 0.1      Absolute Immature Granulocytes 0.0      Absolute NRBCs 0.0     TROPONIN T, HIGH SENSITIVITY - Normal    Troponin T, High Sensitivity 7        Emergency Department Course & Assessments:        Independent Interpretation (X-rays, CTs, rhythm strip):  I independently reviewed the patient's chest X-ray.    Consultations/Discussion of Management or Tests:  ED Course as of 01/18/23 1641   Wed Jan 18, 2023   1630 I obtained the history and examined the patient as noted above.       Disposition:  The patient was discharged to home.     Impression & Plan    Medical Decision Making:  Patient is a 46 yo male who presents with predominately complaints of chest pain he describes as \"chest weakness.\"  He is nontoxic on arrival, in no significant distress.  He is hypertensive on arrival though his BP downtrended during his time in the ED. EKG without focal ischemia or underlying arrhythmia. Delta troponin negative.  His presentation would be atypical of ACS given symptoms >6 hours.  PERC negative, I doubt PE.  CXR without focal pneumonia, fluid overload, pneumothorax or widened mediastinum.  No profound anemia or significant electrolyte derangements.  No abdominal tenderness and I doubt intraabdominal source to explain his presentation.  Patient declined analgesia during his time in the ED.  No further emergent workup needed at this time based on low HEART score. I did recommend however close outpatient PCP f/u and that patient to benefit from outpatient stress test in near future. Encouraged BP diary to assist PCP with possible med changes; no evidence to suggest hypertensive emergency today. He feels comfortable with plan and return precautions given.      Diagnosis:    ICD-10-CM    1. Chest pain, unspecified type  R07.9     "   2. Hypertension, unspecified type  I10             Scribe Disclosure:  I, Jacquelyn Kaufman, am serving as a scribe at 4:26 PM on 1/18/2023 to document services personally performed by Vivienne Arellano DO based on my observations and the provider's statements to me.    1/18/2023   Vivienne Arellano DO McDonald, Lindsey E, DO  01/18/23 2121

## 2023-01-20 ENCOUNTER — HOSPITAL ENCOUNTER (EMERGENCY)
Facility: CLINIC | Age: 48
Discharge: HOME | End: 2023-01-20
Attending: EMERGENCY MEDICINE | Admitting: EMERGENCY MEDICINE
Payer: COMMERCIAL

## 2023-01-20 ENCOUNTER — APPOINTMENT (OUTPATIENT)
Dept: GENERAL RADIOLOGY | Facility: CLINIC | Age: 48
End: 2023-01-20
Attending: EMERGENCY MEDICINE
Payer: COMMERCIAL

## 2023-01-20 VITALS
SYSTOLIC BLOOD PRESSURE: 170 MMHG | HEART RATE: 71 BPM | OXYGEN SATURATION: 100 % | RESPIRATION RATE: 18 BRPM | TEMPERATURE: 98.2 F | DIASTOLIC BLOOD PRESSURE: 100 MMHG

## 2023-01-20 DIAGNOSIS — T17.908A ASPIRATION INTO AIRWAY, INITIAL ENCOUNTER: ICD-10-CM

## 2023-01-20 DIAGNOSIS — F22 DELUSIONAL DISORDER (H): ICD-10-CM

## 2023-01-20 PROCEDURE — 99283 EMERGENCY DEPT VISIT LOW MDM: CPT | Mod: 25

## 2023-01-20 PROCEDURE — 71046 X-RAY EXAM CHEST 2 VIEWS: CPT

## 2023-01-20 ASSESSMENT — ENCOUNTER SYMPTOMS
ABDOMINAL PAIN: 1
NERVOUS/ANXIOUS: 1

## 2023-01-20 NOTE — ED PROVIDER NOTES
"  History     Chief Complaint:  Aspiration       The history is provided by the patient.      Chevy Morris is a 47 year old male who presents following aspiration of food last night. He reports difficulty with gang-stalking recently, and believed someone \"messed with his food and his throat, causing food to go down the wrong pipe. He reports pain in his abdomen at the base of the lungs. Denies suicidal or homicidal ideation. Reports he sees a therapist and takes Zyprexa for persistent delusions.    Independent Historian: Yes, patient.     Review of External Notes: I reviewed virtual visit note from 8/2/22.  For the last two years, since Aron Dewey's killing, he has had paranoid delusions of being stalked and poisoned.     ROS:  Review of Systems   Gastrointestinal: Positive for abdominal pain.   Psychiatric/Behavioral: Negative for suicidal ideas. The patient is nervous/anxious.    All other systems reviewed and are negative.    Allergies:  No Known Allergies     Medications:    Norvasc   Atarax  Lisinopril   Zyprexa  Skyrizi   Humira  Triamcinolone  Valtrex    Past Medical History:    Psoriasis  Obesity  Delusional disorder    Past Surgical History:    Fracture nasal with reduction    Family History:    Father- eczema, hypertension   Mother- bipolar disorder, breast cancer, lupus    Social History:  The patient presents to the ED alone via private vehicle.  [facility]P: [facility]     Physical Exam     Patient Vitals for the past 24 hrs:   BP Temp Temp src Pulse Resp SpO2   01/20/23 1347 (!) 170/100 -- -- -- -- --   01/20/23 1151 (!) 177/101 98.2  F (36.8  C) Temporal 71 18 100 %        Physical Exam  Constitutional: Alert, attentive, GCS 15   Eyes: EOM are normal, anicteric, conjugate gaze  CV: distal extremities warm, well perfused  Chest: Non-labored breathing on RA  GI:  non tender. No distension. No guarding or rebound.    Neurological: Alert, attentive, moving all extremities equally.   Skin: " "Skin is warm and dry.  Psych: Persistent delusions regarding \"gang-stalking.\"    Emergency Department Course   Imaging:  Chest XR,  PA & LAT   Final Result   IMPRESSION: Normal. No significant change.      JUAN PABLO BEYER MD            SYSTEM ID:  [id number]         Report per radiology    Emergency Department Course & Assessments:       Independent Interpretation (X-rays, CTs, rhythm strip):  I independently reviewed the patient's chest x-ray.     Consultations/Discussion of Management or Tests:  ED Course as of 23 1336      1316 I obtained the history and examined the patient as noted above.      Social Determinants of Health affecting care:  Fixed false believes with resulting recurrent medical concerns.    Disposition:  The patient was discharged to home.     Impression & Plan    Medical Decision Makin-year-old male past medical history significant for paranoia/delusions about \"gang stalking\" for which he follows mental health and has had prior admissions presenting for concerns his food yesterday was tampered with while at [employer] when it sounds like he had a brief choking episode.  He was concerned about \"contamination to his lungs\" and persistent disability.  His chest x-ray here is clear, I reviewed with him that I do not suspect a significant aspiration event I will concern for developing aspiration pneumonitis or pneumonia.  He has no homicidal or suicidal ideations, he seems to have at least understanding that his delusions are managed and he follows with his mental health team and has been taking his medications.  I see no indication for hold and I do not see utility in repeat mental health assessment here.  I stressed the importance of following up with his treatment team.  Return precautions reviewed and he was discharged    Diagnosis:    ICD-10-CM    1. Delusional disorder (H)  F22       2. Aspiration into airway, initial encounter  T17.908A          Jason Ball, " MD  [employer]  1:56 PM 01/20/23     Scribe Disclosure:  I, Jacquelyn Kaufman, am serving as a scribe at 1:13 PM on 1/20/2023 to document services personally performed by Jason Ball MD based on my observations and the provider's statements to me.    1/20/2023   Jason Ball MD Dunbar, John Forrest, MD  01/20/23 3487

## 2023-01-20 NOTE — DISCHARGE INSTRUCTIONS
You should continue to take your medications as prescribed, should you develop shortness of breath, fever cough up putrid material, you should return to the emergency department.

## 2023-01-20 NOTE — ED TRIAGE NOTES
"Pt states \"I have been having difficulties with gang stalking for the past couple of nights. Last night after eating, I feel like my food went down the wrong tube. I feel like I have fluid on my lungs\" Denies cough, SOB, chest pain, fever.       "

## 2023-08-20 ENCOUNTER — HEALTH MAINTENANCE LETTER (OUTPATIENT)
Age: 48
End: 2023-08-20

## 2023-09-12 ENCOUNTER — HOSPITAL ENCOUNTER (EMERGENCY)
Facility: CLINIC | Age: 48
Discharge: HOME | End: 2023-09-12
Attending: EMERGENCY MEDICINE | Admitting: EMERGENCY MEDICINE
Payer: COMMERCIAL

## 2023-09-12 VITALS
TEMPERATURE: 97 F | HEART RATE: 76 BPM | DIASTOLIC BLOOD PRESSURE: 98 MMHG | RESPIRATION RATE: 18 BRPM | OXYGEN SATURATION: 100 % | SYSTOLIC BLOOD PRESSURE: 162 MMHG

## 2023-09-12 DIAGNOSIS — R13.10 DYSPHAGIA, UNSPECIFIED TYPE: ICD-10-CM

## 2023-09-12 DIAGNOSIS — R68.2 DRY MOUTH: ICD-10-CM

## 2023-09-12 PROCEDURE — 99283 EMERGENCY DEPT VISIT LOW MDM: CPT

## 2023-09-12 NOTE — ED TRIAGE NOTES
Patient presents stating that his saliva production is down and as a result he is having trouble swallowing and eating food.  Denies that his throat is sore, just that it is dry so he has to drink a lot of water to eat.  Also states that his bowels are not moving like they normally do and that he is constipated with a decreased appetite.     Triage Assessment       Row Name 09/12/23 0700       Triage Assessment (Adult)    Airway WDL WDL       Respiratory WDL    Respiratory WDL WDL       Skin Circulation/Temperature WDL    Skin Circulation/Temperature WDL WDL       Cardiac WDL    Cardiac WDL WDL       Peripheral/Neurovascular WDL    Peripheral Neurovascular WDL WDL       Cognitive/Neuro/Behavioral WDL    Cognitive/Neuro/Behavioral WDL WDL

## 2023-09-12 NOTE — DISCHARGE INSTRUCTIONS
We recommend that you try stopping the hydroxyzine to see if this helps with your dry mouth.  Call your doctor or psychiatrist regarding this medication change so that they are aware.  You can also try sucking on hard candy to increase your saliva production.  Return to the ER if you are not able to swallow at all or if you have other concerns.  You can also follow-up with ENT if your symptoms or not improving.  Call to schedule an appointment.

## 2023-09-12 NOTE — ED PROVIDER NOTES
"  History     Chief Complaint:  Pharyngitis       HPI   Chevy Morris is a 48 year old male who presents to the emergency department with dry throat and trouble swallowing. Patient reports that for the past couple of days his throat has felt constricted and like his saliva is \"not working\". Patient does note green stools over the last couple of days along with frequent urination. Patient denies vomiting, chest pain, abdominal pain, shortness of breath, dysuria, and throat pain. He is still able to swallow water and feels that he has to follow all foods with water in order to get them down.  He is on olanzapine and hydroxyzine for psych history and gets Skyrizi injections.      Independent Historian:   None - Patient Only    Review of External Notes:   Reviewed ER visit from 1/20/2023 when patient was also complaining of difficulty swallowing.  He has a delusional disorder where he thinks a gang stalking him and he believed that someone was messing with his food at that time.      Medications:    Norvasc  Atarax  Zestril  Zyprexa  Skyrizi  Hydroxyzine     Past Medical History:    Psoriasis  Elevated LFTs  Elevated blood pressure  Obesity      Past Surgical History:    21315 nasal fracture with reduction      Physical Exam   Patient Vitals for the past 24 hrs:   BP Temp Temp src Pulse Resp SpO2   09/12/23 0828 -- -- -- 76 18 100 %   09/12/23 0703 (!) 162/98 -- -- -- -- --   09/12/23 0700 -- 97  F (36.1  C) Temporal 81 18 97 %        Physical Exam  Vitals and nursing note reviewed.   Constitutional:       General: He is not in acute distress.     Appearance: He is not ill-appearing.   HENT:      Head: Normocephalic and atraumatic.      Right Ear: External ear normal.      Left Ear: External ear normal.      Nose: Nose normal.      Mouth/Throat:      Mouth: Mucous membranes are moist. No angioedema.      Pharynx: Oropharynx is clear. No pharyngeal swelling, oropharyngeal exudate or posterior oropharyngeal erythema.     "  Tonsils: No tonsillar abscesses.   Eyes:      Conjunctiva/sclera: Conjunctivae normal.   Cardiovascular:      Rate and Rhythm: Normal rate and regular rhythm.      Heart sounds: No murmur heard.  Pulmonary:      Effort: Pulmonary effort is normal. No respiratory distress.      Breath sounds: No wheezing, rhonchi or rales.   Abdominal:      General: Abdomen is flat. There is no distension.      Palpations: Abdomen is soft.      Tenderness: There is no abdominal tenderness. There is no guarding or rebound.   Musculoskeletal:         General: No swelling or deformity.      Cervical back: Normal range of motion and neck supple.   Skin:     General: Skin is warm and dry.      Findings: No rash.   Neurological:      Mental Status: He is alert and oriented to person, place, and time.   Psychiatric:         Mood and Affect: Mood is anxious.         Behavior: Behavior normal.           Emergency Department Course       Emergency Department Course & Assessments:         Interventions:  Medications - No data to display     Assessments:  0810 I obtained history and examined the patient as noted above.   The patient is comfortable with plan for discharge.      Independent Interpretation (X-rays, CTs, rhythm strip):  None    Consultations/Discussion of Management or Tests:  None        Social Determinants of Health affecting care:   None    Disposition:  The patient was discharged to home.     Impression & Plan      Medical Decision Makin-year-old male presenting with dry mouth and difficulty swallowing.  His exam is unremarkable.  There is not appear to be any obstructive lesions and he is not have any difficulty swallowing water at the bedside.  He is tolerating secretions normally and has normal phonation.  He may be having side effects from the hydroxyzine that he uses daily as antihistamines may cause dry mouth.  I recommended that he stop using the hydroxyzine and supplement with sucking on hard candy to increase  saliva production.  Recommend that he call his provider to let them know that he is stopping hydroxyzine so that they are aware of this medication change.  I will also provide ENT follow-up as needed if patient's symptoms persist.  We discussed return precautions as well.      Diagnosis:    ICD-10-CM    1. Dry mouth  R68.2       2. Dysphagia, unspecified type  R13.10            Discharge Medications:  Discharge Medication List as of 9/12/2023  8:25 AM             Scribe Disclosure:  I, Crys Ferrari, am serving as a scribe at 8:23 AM on 9/12/2023 to document services personally performed by Kit Lara MD based on my observations and the provider's statements to me.   9/12/2023   Kit Lara MD Goodwin, Shaun M, MD  09/12/23 0837

## 2023-09-25 ENCOUNTER — HOSPITAL ENCOUNTER (EMERGENCY)
Facility: CLINIC | Age: 48
Discharge: LEFT BEFORE DISCHARGE | End: 2023-09-25
Admitting: EMERGENCY MEDICINE
Payer: COMMERCIAL

## 2023-09-25 VITALS
OXYGEN SATURATION: 96 % | HEART RATE: 81 BPM | SYSTOLIC BLOOD PRESSURE: 163 MMHG | DIASTOLIC BLOOD PRESSURE: 120 MMHG | TEMPERATURE: 97.6 F | RESPIRATION RATE: 16 BRPM

## 2023-09-25 LAB
ALBUMIN UR-MCNC: 20 MG/DL
APPEARANCE UR: CLEAR
BILIRUB UR QL STRIP: NEGATIVE
COLOR UR AUTO: YELLOW
GLUCOSE UR STRIP-MCNC: NEGATIVE MG/DL
HGB UR QL STRIP: ABNORMAL
KETONES UR STRIP-MCNC: NEGATIVE MG/DL
LEUKOCYTE ESTERASE UR QL STRIP: NEGATIVE
MUCOUS THREADS #/AREA URNS LPF: PRESENT /LPF
NITRATE UR QL: NEGATIVE
PH UR STRIP: 5.5 [PH] (ref 5–7)
RBC URINE: 2 /HPF
SP GR UR STRIP: 1.03 (ref 1–1.03)
SQUAMOUS EPITHELIAL: <1 /HPF
UROBILINOGEN UR STRIP-MCNC: NORMAL MG/DL
WBC URINE: 4 /HPF

## 2023-09-25 PROCEDURE — 99281 EMR DPT VST MAYX REQ PHY/QHP: CPT

## 2023-09-25 PROCEDURE — 81001 URINALYSIS AUTO W/SCOPE: CPT | Performed by: EMERGENCY MEDICINE

## 2023-09-25 ASSESSMENT — ACTIVITIES OF DAILY LIVING (ADL): ADLS_ACUITY_SCORE: 33

## 2023-09-25 NOTE — ED TRIAGE NOTES
Patient reports going to the bathroom more frequently. Patient is concerned he has been poisoned, but does not feel threatened by anyone. Reports some SI, but has no plan. ABCs intact in triage.

## 2024-06-01 ENCOUNTER — APPOINTMENT (OUTPATIENT)
Dept: GENERAL RADIOLOGY | Facility: CLINIC | Age: 49
End: 2024-06-01
Attending: EMERGENCY MEDICINE
Payer: COMMERCIAL

## 2024-06-01 ENCOUNTER — HOSPITAL ENCOUNTER (EMERGENCY)
Facility: CLINIC | Age: 49
Discharge: HOME | End: 2024-06-01
Attending: EMERGENCY MEDICINE | Admitting: EMERGENCY MEDICINE
Payer: COMMERCIAL

## 2024-06-01 VITALS
OXYGEN SATURATION: 97 % | TEMPERATURE: 97.5 F | DIASTOLIC BLOOD PRESSURE: 105 MMHG | RESPIRATION RATE: 18 BRPM | SYSTOLIC BLOOD PRESSURE: 155 MMHG | HEART RATE: 77 BPM

## 2024-06-01 DIAGNOSIS — M79.672 PAIN IN BOTH FEET: ICD-10-CM

## 2024-06-01 DIAGNOSIS — R06.02 SHORTNESS OF BREATH: ICD-10-CM

## 2024-06-01 DIAGNOSIS — I10 UNCONTROLLED HYPERTENSION: ICD-10-CM

## 2024-06-01 DIAGNOSIS — M79.671 PAIN IN BOTH FEET: ICD-10-CM

## 2024-06-01 LAB
ANION GAP SERPL CALCULATED.3IONS-SCNC: 13 MMOL/L (ref 7–15)
BASOPHILS # BLD AUTO: 0 10E3/UL (ref 0–0.2)
BASOPHILS NFR BLD AUTO: 0 %
BUN SERPL-MCNC: 14.3 MG/DL (ref 6–20)
CALCIUM SERPL-MCNC: 8.9 MG/DL (ref 8.6–10)
CHLORIDE SERPL-SCNC: 107 MMOL/L (ref 98–107)
CREAT SERPL-MCNC: 1.04 MG/DL (ref 0.67–1.17)
D DIMER PPP FEU-MCNC: <0.27 UG/ML FEU (ref 0–0.5)
DEPRECATED HCO3 PLAS-SCNC: 23 MMOL/L (ref 22–29)
EGFRCR SERPLBLD CKD-EPI 2021: 88 ML/MIN/1.73M2
EOSINOPHIL # BLD AUTO: 0.2 10E3/UL (ref 0–0.7)
EOSINOPHIL NFR BLD AUTO: 1 %
ERYTHROCYTE [DISTWIDTH] IN BLOOD BY AUTOMATED COUNT: 12.7 % (ref 10–15)
GLUCOSE SERPL-MCNC: 104 MG/DL (ref 70–99)
HCT VFR BLD AUTO: 48 % (ref 40–53)
HGB BLD-MCNC: 16.4 G/DL (ref 13.3–17.7)
HOLD SPECIMEN: NORMAL
HOLD SPECIMEN: NORMAL
IMM GRANULOCYTES # BLD: 0 10E3/UL
IMM GRANULOCYTES NFR BLD: 0 %
LYMPHOCYTES # BLD AUTO: 1.7 10E3/UL (ref 0.8–5.3)
LYMPHOCYTES NFR BLD AUTO: 14 %
MCH RBC QN AUTO: 31.2 PG (ref 26.5–33)
MCHC RBC AUTO-ENTMCNC: 34.2 G/DL (ref 31.5–36.5)
MCV RBC AUTO: 91 FL (ref 78–100)
MONOCYTES # BLD AUTO: 0.6 10E3/UL (ref 0–1.3)
MONOCYTES NFR BLD AUTO: 5 %
NEUTROPHILS # BLD AUTO: 10.1 10E3/UL (ref 1.6–8.3)
NEUTROPHILS NFR BLD AUTO: 80 %
NRBC # BLD AUTO: 0 10E3/UL
NRBC BLD AUTO-RTO: 0 /100
PLATELET # BLD AUTO: 168 10E3/UL (ref 150–450)
POTASSIUM SERPL-SCNC: 4.3 MMOL/L (ref 3.4–5.3)
RBC # BLD AUTO: 5.25 10E6/UL (ref 4.4–5.9)
SODIUM SERPL-SCNC: 143 MMOL/L (ref 135–145)
WBC # BLD AUTO: 12.6 10E3/UL (ref 4–11)

## 2024-06-01 PROCEDURE — 80048 BASIC METABOLIC PNL TOTAL CA: CPT | Performed by: EMERGENCY MEDICINE

## 2024-06-01 PROCEDURE — 73630 X-RAY EXAM OF FOOT: CPT | Mod: 50

## 2024-06-01 PROCEDURE — 99285 EMERGENCY DEPT VISIT HI MDM: CPT | Mod: 25

## 2024-06-01 PROCEDURE — 93005 ELECTROCARDIOGRAM TRACING: CPT

## 2024-06-01 PROCEDURE — 85025 COMPLETE CBC W/AUTO DIFF WBC: CPT | Performed by: EMERGENCY MEDICINE

## 2024-06-01 PROCEDURE — 85379 FIBRIN DEGRADATION QUANT: CPT | Performed by: EMERGENCY MEDICINE

## 2024-06-01 PROCEDURE — 36415 COLL VENOUS BLD VENIPUNCTURE: CPT | Performed by: EMERGENCY MEDICINE

## 2024-06-01 PROCEDURE — 71046 X-RAY EXAM CHEST 2 VIEWS: CPT

## 2024-06-01 ASSESSMENT — COLUMBIA-SUICIDE SEVERITY RATING SCALE - C-SSRS
1. IN THE PAST MONTH, HAVE YOU WISHED YOU WERE DEAD OR WISHED YOU COULD GO TO SLEEP AND NOT WAKE UP?: NO
2. HAVE YOU ACTUALLY HAD ANY THOUGHTS OF KILLING YOURSELF IN THE PAST MONTH?: NO
6. HAVE YOU EVER DONE ANYTHING, STARTED TO DO ANYTHING, OR PREPARED TO DO ANYTHING TO END YOUR LIFE?: YES

## 2024-06-01 ASSESSMENT — ACTIVITIES OF DAILY LIVING (ADL)
ADLS_ACUITY_SCORE: 35
ADLS_ACUITY_SCORE: 35

## 2024-06-01 NOTE — DISCHARGE INSTRUCTIONS
Please return to the emergency department if your symptoms increase, you experience any problems breathing or swallowing, or any swelling of your mouth, lips, tongue.    Please follow-up with your primary care provider in the next week.    You can use tylenol and motrin for your foot pain.

## 2024-06-01 NOTE — ED TRIAGE NOTES
Pt arrives with c/o SOB. Pt reports some SOB after eating today. Pt also notes he painted at a house this AM. Pt tachycardic and HTN in triage. Pt also endorses ongoing toe pain for months.      Triage Assessment (Adult)       Row Name 06/01/24 1401          Triage Assessment    Airway WDL WDL        Respiratory WDL    Respiratory WDL X;rhythm/pattern     Rhythm/Pattern, Respiratory shortness of breath        Cardiac WDL    Cardiac WDL WDL;X;rhythm     Pulse Rate & Regularity tachycardic        Peripheral/Neurovascular WDL    Peripheral Neurovascular WDL WDL        Cognitive/Neuro/Behavioral WDL    Cognitive/Neuro/Behavioral WDL WDL

## 2024-06-01 NOTE — ED PROVIDER NOTES
Emergency Department Note      History of Present Illness     Chief Complaint  Shortness of Breath    HPI  Chevy Morris is a 49 year old male who presents to the emergency department for evaluation of shortness of breath. Chevy reports that for the past couple days he has experience intermittent episodes of shortness of breath. The patient explains that these episodes of shortness of breath usually last 30 minutes and self resolve. Chevy states that his symptoms starts after he eats food. The patient denies fever, cough, vomiting, diarrhea, or recent illness. He also denies swelling of the lips, tongue, and throat. Chevy claims he has not experienced symptoms like this before. He denies having consumed any new foods, but admits he started smoking a couple weeks ago. The patient denies history of asthma nor COPD. Chevy also mentioned concerns with toe pain that has been ongoing for months. He notes pain with wiggling his toes but denies any redness or swelling.    Independent Historian  None    Review of External Notes  Reviewed patient's office visit on 3/12/2024, patient increased his lisinopril to 20 mg daily secondary to uncontrolled hypertension.  Past Medical History   Medical History and Problem List  Obesity  Paranoia  Psoriasis    Medications  Norvasc  Atarax  Zestril  Zyprexa  Skyrizi  Humira    Physical Exam   Patient Vitals for the past 24 hrs:   BP Temp Temp src Pulse Resp SpO2   06/01/24 1750 (!) 155/105 -- -- 77 -- 97 %   06/01/24 1556 (!) 180/117 -- -- -- -- --   06/01/24 1403 (!) 195/118 97.5  F (36.4  C) Temporal 111 18 95 %     Physical Exam  General: Well-nourished, resting comfortably when I enter the room  Eyes: Pupils equal, conjunctivae pink no scleral icterus or conjunctival injection  ENT:  Moist mucus membranes  Respiratory:  Lungs clear to auscultation bilaterally, no crackles/rubs/wheezes.  Good air movement  CV: Normal rate and rhythm, no murmurs  GI:  Abdomen soft and  non-distended.  No tenderness, guarding or rebound  Skin: Warm, dry.  No rashes or petechiae  Musculoskeletal: No peripheral edema or calf tenderness.  Bilateral feet and toes, no redness, no swelling, no pain with palpation.  Toes have full range of motion.  Neuro: Alert and oriented to person/place/time  Psychiatric: Normal affect    Diagnostics   Lab Results   Labs Ordered and Resulted from Time of ED Arrival to Time of ED Departure   BASIC METABOLIC PANEL - Abnormal       Result Value    Sodium 143      Potassium 4.3      Chloride 107      Carbon Dioxide (CO2) 23      Anion Gap 13      Urea Nitrogen 14.3      Creatinine 1.04      GFR Estimate 88      Calcium 8.9      Glucose 104 (*)    CBC WITH PLATELETS AND DIFFERENTIAL - Abnormal    WBC Count 12.6 (*)     RBC Count 5.25      Hemoglobin 16.4      Hematocrit 48.0      MCV 91      MCH 31.2      MCHC 34.2      RDW 12.7      Platelet Count 168      % Neutrophils 80      % Lymphocytes 14      % Monocytes 5      % Eosinophils 1      % Basophils 0      % Immature Granulocytes 0      NRBCs per 100 WBC 0      Absolute Neutrophils 10.1 (*)     Absolute Lymphocytes 1.7      Absolute Monocytes 0.6      Absolute Eosinophils 0.2      Absolute Basophils 0.0      Absolute Immature Granulocytes 0.0      Absolute NRBCs 0.0     D DIMER QUANTITATIVE - Normal    D-Dimer Quantitative <0.27         Imaging  Chest XR,  PA & LAT   Final Result   IMPRESSION: Negative chest.      XR Foot Bilateral G/E 3 Views   Final Result   IMPRESSION: Normal joint spaces and alignment. No erosive change. No fracture.          EKG   ECG taken at 1422, ECG read at 1422  Sinus tachycardia   Possible Left atrial enlargement   Possible Inferior infarct , age undetermined   Abnormal ECG   When compared with ECG of 18-JAN-2023 13:56,   Vent. rate has increased BY  50 BPM   Borderline criteria for Inferior infarct are now Present   Nonspecific T wave abnormality, worse in Lateral leads    Rate 106 bpm. TN  interval 166 ms. QRS duration 98 ms. QT/QTc 356/472 ms. P-R-T axes 48 18 72.    Independent Interpretation  Chest x-ray does not show any sign of consolidation.  Bilateral foot x-rays do not show any fracture or dislocation.  ED Course    Medications Administered  Medications - No data to display    Discussion of Management  None    Social Determinants of Health adding to complexity of care  None    ED Course  ED Course as of 06/01/24 2131   Sat Jun 01, 2024   1548 I obtained history and examined the patient as noted above     1554 I retook patient blood pressure.     Medical Decision Making / Diagnosis   CMS Diagnoses: None    MIPS     None    SHEILA   Chevy W Arturo is a 49 year old male with a history of paranoia, hypertension, and delusions presents to the emergency department with a complaint of shortness of breath after he eats food.  Patient reports this has been going on for several days and only happens intermittently when he eats food.  No new foods.  He does take lisinopril, but denies any swelling of the mouth, tongue, oropharynx.  Shortness of breath resolves within 30 minutes on its own without any intervention.  On exam patient is well-appearing.  He is currently not short of breath.  Vital signs are reassuring and he is not tachycardic or hypoxic.  Lung sounds are clear.  Patient also does mention that he has had bilateral foot pain for several months that he would like to be evaluated for.  He has not had any trauma to his feet.  There is no redness, swelling, tenderness to palpation.  Patient reports that he gets pain in all of his toes.  X-rays do not show any fracture or dislocation.  Patient also is hypertensive here in the ED, he was supposed to follow-up with his primary care physician in order to increase his lisinopril.  No signs of endorgan damage, hypertensive emergency or urgency.  I will have the patient follow-up with his primary care physician for his uncontrolled hypertension and his  foot pain.  At this point I do not think that he is having an allergic reaction, anaphylaxis, or angioedema.  Patient agrees, he feels reassured.  Patient is discharged home.    Disposition  The patient was discharged.     ICD-10 Codes:    ICD-10-CM    1. Shortness of breath  R06.02       2. Pain in both feet  M79.671     M79.672       3. Uncontrolled hypertension  I10            Discharge Medications  Discharge Medication List as of 6/1/2024  5:51 PM            Scribe Disclosure:  I, Shaquille Johansen, am serving as a scribe at 5:32 PM on 6/1/2024 to document services personally performed by Chela Robledo MD based on my observations and the provider's statements to me.        Chela Robledo MD  06/01/24 0059

## 2024-06-03 LAB
ATRIAL RATE - MUSE: 106 BPM
DIASTOLIC BLOOD PRESSURE - MUSE: NORMAL MMHG
INTERPRETATION ECG - MUSE: NORMAL
P AXIS - MUSE: 48 DEGREES
PR INTERVAL - MUSE: 166 MS
QRS DURATION - MUSE: 98 MS
QT - MUSE: 356 MS
QTC - MUSE: 472 MS
R AXIS - MUSE: 18 DEGREES
SYSTOLIC BLOOD PRESSURE - MUSE: NORMAL MMHG
T AXIS - MUSE: 72 DEGREES
VENTRICULAR RATE- MUSE: 106 BPM

## 2024-06-06 ENCOUNTER — HOSPITAL ENCOUNTER (EMERGENCY)
Facility: CLINIC | Age: 49
Discharge: HOME | End: 2024-06-06
Attending: EMERGENCY MEDICINE | Admitting: EMERGENCY MEDICINE
Payer: COMMERCIAL

## 2024-06-06 VITALS
HEIGHT: 70 IN | DIASTOLIC BLOOD PRESSURE: 117 MMHG | OXYGEN SATURATION: 97 % | BODY MASS INDEX: 36.04 KG/M2 | RESPIRATION RATE: 20 BRPM | SYSTOLIC BLOOD PRESSURE: 184 MMHG | HEART RATE: 84 BPM | TEMPERATURE: 98 F | WEIGHT: 251.77 LBS

## 2024-06-06 DIAGNOSIS — K21.00 GASTROESOPHAGEAL REFLUX DISEASE WITH ESOPHAGITIS WITHOUT HEMORRHAGE: ICD-10-CM

## 2024-06-06 LAB
ALBUMIN UR-MCNC: 10 MG/DL
APPEARANCE UR: CLEAR
BILIRUB UR QL STRIP: NEGATIVE
COLOR UR AUTO: YELLOW
GLUCOSE UR STRIP-MCNC: NEGATIVE MG/DL
HGB UR QL STRIP: NEGATIVE
KETONES UR STRIP-MCNC: NEGATIVE MG/DL
LEUKOCYTE ESTERASE UR QL STRIP: NEGATIVE
MUCOUS THREADS #/AREA URNS LPF: PRESENT /LPF
NITRATE UR QL: NEGATIVE
PH UR STRIP: 6.5 [PH] (ref 5–7)
RBC URINE: 1 /HPF
SP GR UR STRIP: 1.03 (ref 1–1.03)
UROBILINOGEN UR STRIP-MCNC: NORMAL MG/DL
WBC URINE: 1 /HPF

## 2024-06-06 PROCEDURE — 99283 EMERGENCY DEPT VISIT LOW MDM: CPT

## 2024-06-06 PROCEDURE — 81001 URINALYSIS AUTO W/SCOPE: CPT | Performed by: EMERGENCY MEDICINE

## 2024-06-06 ASSESSMENT — COLUMBIA-SUICIDE SEVERITY RATING SCALE - C-SSRS
6. HAVE YOU EVER DONE ANYTHING, STARTED TO DO ANYTHING, OR PREPARED TO DO ANYTHING TO END YOUR LIFE?: YES
2. HAVE YOU ACTUALLY HAD ANY THOUGHTS OF KILLING YOURSELF IN THE PAST MONTH?: NO
1. IN THE PAST MONTH, HAVE YOU WISHED YOU WERE DEAD OR WISHED YOU COULD GO TO SLEEP AND NOT WAKE UP?: NO

## 2024-06-06 ASSESSMENT — ACTIVITIES OF DAILY LIVING (ADL): ADLS_ACUITY_SCORE: 33

## 2024-06-06 NOTE — ED TRIAGE NOTES
"Arrives via EMS with complaints of \"esophagus problems\" and difficulty urinating. Reports drinking liquids before bed and then regurgitating the same liquids in the morning. Denies chest pain, nausea, or indigestion. Also complaining of \"a weak stream\" while urinating. Denies dysuria, frequency, or hematuria. Alert and oriented, flat affect in triage, ABCs intact.     Triage Assessment (Adult)       Row Name 06/06/24 0745          Triage Assessment    Airway WDL WDL        Respiratory WDL    Respiratory WDL WDL        Skin Circulation/Temperature WDL    Skin Circulation/Temperature WDL WDL        Cardiac WDL    Cardiac WDL WDL        Peripheral/Neurovascular WDL    Peripheral Neurovascular WDL WDL        Cognitive/Neuro/Behavioral WDL    Cognitive/Neuro/Behavioral WDL WDL                     "

## 2024-06-06 NOTE — DISCHARGE INSTRUCTIONS
Start omeprazole daily  See your doctor for prostate concern.  Avoid fatty, greasy acidic food. No alcohol and caffeine.   Do not eat before bed

## 2024-06-06 NOTE — ED PROVIDER NOTES
"  Emergency Department Note      History of Present Illness     Chief Complaint  Dysphagia and Urinary Retention    HPI  Chevy Morris is a 49 year old male who presents to the emergency department for evaluation of dysphagia and urinary retention. He notes that for the past month, he has been drinking orange carbonated soda right before he goes to bed. The next morning he has noticed that he is \"spitting orange.\" He does not note any other foods specifically that cause this acid reflux to occur. He was seen in the ED on 6/1/24 for shortness of breath and received a CXR, which was unremarkable to any abnormal findings. He also notes that he has had difficulty urinating for the past month or so, and feels that it takes him more effort than usual to urinate. He denies any fever, chills, blood in urine, or history of prostate issues. He has not been seen by a PCP for either the acid reflux or the urinary symptoms yet. He does not have any pain when he is eating, and has not had a GI endoscope done. He is currently taking lisinopril for HTN, and is also taking skyrizi at this time. He does also note that he smokes half a pack a day, and does have some cough but attributes this as normal per his smoking habits.     Independent Historian  None    Review of External Notes  None  Past Medical History   Medical History and Problem List  Psoriasis  Obesity  Paranoia    Medications  amlodipine   hydroxyzine   lisinopril   Olanzapine zydis   Risankizumab-rzaa    Surgical History  None  Physical Exam   Patient Vitals for the past 24 hrs:   BP Temp Temp src Pulse Resp SpO2 Height Weight   06/06/24 0929 -- -- -- -- -- -- 1.778 m (5' 10\") --   06/06/24 0745 (!) 184/117 98  F (36.7  C) Temporal 84 20 97 % -- 114.2 kg (251 lb 12.3 oz)   06/06/24 0739 (!) 175/108 -- -- 90 -- 97 % -- --     Physical Exam  Constitutional:       Appearance: He is well-developed.   HENT:      Mouth/Throat:      Mouth: Mucous membranes are moist.      " Pharynx: Oropharynx is clear. No oropharyngeal exudate.   Eyes:      General: No scleral icterus.     Conjunctiva/sclera: Conjunctivae normal.      Pupils: Pupils are equal, round, and reactive to light.   Cardiovascular:      Rate and Rhythm: Normal rate and regular rhythm.      Heart sounds: Normal heart sounds. No murmur heard.     No friction rub. No gallop.   Pulmonary:      Effort: Pulmonary effort is normal. No respiratory distress.      Breath sounds: Normal breath sounds. No wheezing or rales.   Abdominal:      General: Bowel sounds are normal. There is no distension.      Palpations: Abdomen is soft. There is no mass.      Tenderness: There is no abdominal tenderness.   Skin:     General: Skin is warm and dry.      Capillary Refill: Capillary refill takes less than 2 seconds.      Findings: No rash.   Neurological:      Mental Status: He is alert.         Diagnostics   Lab Results   Labs Ordered and Resulted from Time of ED Arrival to Time of ED Departure   ROUTINE UA WITH MICROSCOPIC REFLEX TO CULTURE - Abnormal       Result Value    Color Urine Yellow      Appearance Urine Clear      Glucose Urine Negative      Bilirubin Urine Negative      Ketones Urine Negative      Specific Gravity Urine 1.026      Blood Urine Negative      pH Urine 6.5      Protein Albumin Urine 10 (*)     Urobilinogen Urine Normal      Nitrite Urine Negative      Leukocyte Esterase Urine Negative      Mucus Urine Present (*)     RBC Urine 1      WBC Urine 1         Imaging  No orders to display     Independent Interpretation  None  ED Course    Medications Administered  Medications - No data to display    Procedures  Procedures     Discussion of Management  None    Social Determinants of Health adding to complexity of care  None    ED Course  ED Course as of 06/06/24 0950   Thu Jun 06, 2024   0915 I obtained history and examined the patient as noted above.  I explained findings. I discussed plan for discharge home.         Medical  Decision Making / Diagnosis   CMS Diagnoses: None    MIPS     None    [facility]  Chevy Morris is a 49 year old male who presents with the above symptoms.  His urine showed no signs of infection.  His exam is normal without any acute pain.  I discussed with him likely he needs to get evaluated with his prostate issue that may be causing weak stream.  Since it all signs of infection, antibiotic is not needed at this point.  It does sound like he also has reflux and likely esophagitis.  I asked him to avoid eating and drinking before bedtime.  His last meal should be at least 2 hours before bedtime.  He is to initiate omeprazole or some other type of proton pump inhibitor and avoiding certain things like caffeine and alcohol and smoking.  I discussed dietary changes with him as well.  He is referred back to his primary doctor for follow-up.  He is given a referral to urology for evaluation of BPH.  Given his well appearance and lack of exam findings, patient is appropriate for discharge without any further evaluation.  Return precaution provided.    Disposition  The patient was discharged.     ICD-10 Codes:    ICD-10-CM    1. Gastroesophageal reflux disease with esophagitis without hemorrhage  K21.00            Discharge Medications  Discharge Medication List as of 6/6/2024  9:30 AM            Scribe Disclosure:  I, Dee Krause, am serving as a scribe at 9:49 AM on 6/6/2024 to document services personally performed by No att. providers found based on my observations and the provider's statements to me.        Diogo Ron MD  06/06/24 1450

## 2025-04-12 ENCOUNTER — HEALTH MAINTENANCE LETTER (OUTPATIENT)
Age: 50
End: 2025-04-12